# Patient Record
Sex: FEMALE | Race: WHITE | NOT HISPANIC OR LATINO | Employment: FULL TIME | URBAN - METROPOLITAN AREA
[De-identification: names, ages, dates, MRNs, and addresses within clinical notes are randomized per-mention and may not be internally consistent; named-entity substitution may affect disease eponyms.]

---

## 2018-03-22 ENCOUNTER — HOSPITAL ENCOUNTER (OUTPATIENT)
Dept: RADIOLOGY | Facility: MEDICAL CENTER | Age: 18
End: 2018-03-22
Attending: FAMILY MEDICINE
Payer: COMMERCIAL

## 2018-03-22 PROCEDURE — 74183 MRI ABD W/O CNTR FLWD CNTR: CPT

## 2018-03-22 PROCEDURE — A9581 GADOXETATE DISODIUM INJ: HCPCS | Performed by: FAMILY MEDICINE

## 2018-03-22 PROCEDURE — 700117 HCHG RX CONTRAST REV CODE 255: Performed by: FAMILY MEDICINE

## 2018-03-22 RX ADMIN — GADOXETATE DISODIUM 10 ML: 181.43 INJECTION, SOLUTION INTRAVENOUS at 11:16

## 2021-07-04 ENCOUNTER — HOSPITAL ENCOUNTER (EMERGENCY)
Facility: MEDICAL CENTER | Age: 21
End: 2021-07-05
Attending: EMERGENCY MEDICINE
Payer: COMMERCIAL

## 2021-07-04 DIAGNOSIS — T78.40XA ALLERGIC REACTION, INITIAL ENCOUNTER: ICD-10-CM

## 2021-07-04 DIAGNOSIS — J39.2 SWELLING OF THROAT: ICD-10-CM

## 2021-07-04 LAB
ALBUMIN SERPL BCP-MCNC: 4.5 G/DL (ref 3.2–4.9)
ALBUMIN/GLOB SERPL: 1.5 G/DL
ALP SERPL-CCNC: 42 U/L (ref 30–99)
ALT SERPL-CCNC: 13 U/L (ref 2–50)
ANION GAP SERPL CALC-SCNC: 13 MMOL/L (ref 7–16)
AST SERPL-CCNC: 16 U/L (ref 12–45)
BASOPHILS # BLD AUTO: 0.4 % (ref 0–1.8)
BASOPHILS # BLD: 0.03 K/UL (ref 0–0.12)
BILIRUB SERPL-MCNC: 0.3 MG/DL (ref 0.1–1.5)
BUN SERPL-MCNC: 11 MG/DL (ref 8–22)
CALCIUM SERPL-MCNC: 9.2 MG/DL (ref 8.5–10.5)
CHLORIDE SERPL-SCNC: 106 MMOL/L (ref 96–112)
CO2 SERPL-SCNC: 25 MMOL/L (ref 20–33)
CREAT SERPL-MCNC: 0.65 MG/DL (ref 0.5–1.4)
EOSINOPHIL # BLD AUTO: 0.08 K/UL (ref 0–0.51)
EOSINOPHIL NFR BLD: 1.2 % (ref 0–6.9)
ERYTHROCYTE [DISTWIDTH] IN BLOOD BY AUTOMATED COUNT: 39.8 FL (ref 35.9–50)
GLOBULIN SER CALC-MCNC: 3.1 G/DL (ref 1.9–3.5)
GLUCOSE SERPL-MCNC: 105 MG/DL (ref 65–99)
HCG SERPL QL: NEGATIVE
HCT VFR BLD AUTO: 43.1 % (ref 37–47)
HGB BLD-MCNC: 14.7 G/DL (ref 12–16)
IMM GRANULOCYTES # BLD AUTO: 0.01 K/UL (ref 0–0.11)
IMM GRANULOCYTES NFR BLD AUTO: 0.1 % (ref 0–0.9)
LYMPHOCYTES # BLD AUTO: 3.33 K/UL (ref 1–4.8)
LYMPHOCYTES NFR BLD: 49.7 % (ref 22–41)
MCH RBC QN AUTO: 31.4 PG (ref 27–33)
MCHC RBC AUTO-ENTMCNC: 34.1 G/DL (ref 33.6–35)
MCV RBC AUTO: 92.1 FL (ref 81.4–97.8)
MONOCYTES # BLD AUTO: 0.57 K/UL (ref 0–0.85)
MONOCYTES NFR BLD AUTO: 8.5 % (ref 0–13.4)
NEUTROPHILS # BLD AUTO: 2.68 K/UL (ref 2–7.15)
NEUTROPHILS NFR BLD: 40.1 % (ref 44–72)
NRBC # BLD AUTO: 0 K/UL
NRBC BLD-RTO: 0 /100 WBC
PLATELET # BLD AUTO: 206 K/UL (ref 164–446)
PMV BLD AUTO: 9.6 FL (ref 9–12.9)
POTASSIUM SERPL-SCNC: 3.4 MMOL/L (ref 3.6–5.5)
PROT SERPL-MCNC: 7.6 G/DL (ref 6–8.2)
RBC # BLD AUTO: 4.68 M/UL (ref 4.2–5.4)
SODIUM SERPL-SCNC: 144 MMOL/L (ref 135–145)
WBC # BLD AUTO: 6.7 K/UL (ref 4.8–10.8)

## 2021-07-04 PROCEDURE — 84703 CHORIONIC GONADOTROPIN ASSAY: CPT

## 2021-07-04 PROCEDURE — 700111 HCHG RX REV CODE 636 W/ 250 OVERRIDE (IP): Performed by: EMERGENCY MEDICINE

## 2021-07-04 PROCEDURE — 96375 TX/PRO/DX INJ NEW DRUG ADDON: CPT

## 2021-07-04 PROCEDURE — 96372 THER/PROPH/DIAG INJ SC/IM: CPT

## 2021-07-04 PROCEDURE — 36415 COLL VENOUS BLD VENIPUNCTURE: CPT

## 2021-07-04 PROCEDURE — 96374 THER/PROPH/DIAG INJ IV PUSH: CPT

## 2021-07-04 PROCEDURE — 85025 COMPLETE CBC W/AUTO DIFF WBC: CPT

## 2021-07-04 PROCEDURE — 99284 EMERGENCY DEPT VISIT MOD MDM: CPT

## 2021-07-04 PROCEDURE — 700111 HCHG RX REV CODE 636 W/ 250 OVERRIDE (IP)

## 2021-07-04 PROCEDURE — 80053 COMPREHEN METABOLIC PANEL: CPT

## 2021-07-04 RX ORDER — EPINEPHRINE 1 MG/ML(1)
0.3 AMPUL (ML) INJECTION ONCE
Status: COMPLETED | OUTPATIENT
Start: 2021-07-04 | End: 2021-07-04

## 2021-07-04 RX ORDER — METHYLPREDNISOLONE SODIUM SUCCINATE 125 MG/2ML
125 INJECTION, POWDER, LYOPHILIZED, FOR SOLUTION INTRAMUSCULAR; INTRAVENOUS ONCE
Status: COMPLETED | OUTPATIENT
Start: 2021-07-04 | End: 2021-07-04

## 2021-07-04 RX ORDER — DIPHENHYDRAMINE HYDROCHLORIDE 50 MG/ML
50 INJECTION INTRAMUSCULAR; INTRAVENOUS ONCE
Status: COMPLETED | OUTPATIENT
Start: 2021-07-04 | End: 2021-07-04

## 2021-07-04 RX ORDER — ONDANSETRON 2 MG/ML
4 INJECTION INTRAMUSCULAR; INTRAVENOUS ONCE
Status: DISCONTINUED | OUTPATIENT
Start: 2021-07-05 | End: 2021-07-05 | Stop reason: HOSPADM

## 2021-07-04 RX ORDER — EPINEPHRINE 1 MG/ML(1)
AMPUL (ML) INJECTION
Status: COMPLETED
Start: 2021-07-04 | End: 2021-07-04

## 2021-07-04 RX ORDER — PREDNISONE 20 MG/1
40 TABLET ORAL DAILY
Qty: 6 TABLET | Refills: 0 | Status: SHIPPED | OUTPATIENT
Start: 2021-07-04 | End: 2021-07-07

## 2021-07-04 RX ADMIN — Medication 0.3 MG: at 21:49

## 2021-07-04 RX ADMIN — EPINEPHRINE 0.3 MG: 1 INJECTION INTRAMUSCULAR; INTRAVENOUS; SUBCUTANEOUS at 21:49

## 2021-07-04 RX ADMIN — FAMOTIDINE 20 MG: 10 INJECTION INTRAVENOUS at 21:52

## 2021-07-04 RX ADMIN — METHYLPREDNISOLONE SODIUM SUCCINATE 125 MG: 125 INJECTION, POWDER, FOR SOLUTION INTRAMUSCULAR; INTRAVENOUS at 21:52

## 2021-07-04 RX ADMIN — DIPHENHYDRAMINE HYDROCHLORIDE 50 MG: 50 INJECTION INTRAMUSCULAR; INTRAVENOUS at 21:56

## 2021-07-04 ASSESSMENT — LIFESTYLE VARIABLES
EVER HAD A DRINK FIRST THING IN THE MORNING TO STEADY YOUR NERVES TO GET RID OF A HANGOVER: NO
TOTAL SCORE: 0
EVER FELT BAD OR GUILTY ABOUT YOUR DRINKING: NO
TOTAL SCORE: 0
DOES PATIENT WANT TO STOP DRINKING: NO
HAVE YOU EVER FELT YOU SHOULD CUT DOWN ON YOUR DRINKING: NO
DO YOU DRINK ALCOHOL: YES
CONSUMPTION TOTAL: POSITIVE
HAVE PEOPLE ANNOYED YOU BY CRITICIZING YOUR DRINKING: NO
AVERAGE NUMBER OF DAYS PER WEEK YOU HAVE A DRINK CONTAINING ALCOHOL: 1
ON A TYPICAL DAY WHEN YOU DRINK ALCOHOL HOW MANY DRINKS DO YOU HAVE: 2
TOTAL SCORE: 0
HOW MANY TIMES IN THE PAST YEAR HAVE YOU HAD 5 OR MORE DRINKS IN A DAY: 10

## 2021-07-05 VITALS
DIASTOLIC BLOOD PRESSURE: 66 MMHG | TEMPERATURE: 97.7 F | HEIGHT: 66 IN | BODY MASS INDEX: 22.5 KG/M2 | RESPIRATION RATE: 14 BRPM | SYSTOLIC BLOOD PRESSURE: 109 MMHG | WEIGHT: 140 LBS | HEART RATE: 78 BPM | OXYGEN SATURATION: 97 %

## 2021-07-05 PROCEDURE — 96375 TX/PRO/DX INJ NEW DRUG ADDON: CPT

## 2021-07-05 NOTE — ED TRIAGE NOTES
Pt presents to ED triage with swelling of throat, possible allergic reaction. Started 30 min PTA. Has not taken any OTC medications. + use of inhaler. Voice is horse. Tonsils and uvula are swollen. Take to red 12 and chart up for ERP

## 2021-07-05 NOTE — ED PROVIDER NOTES
ED Provider Note    Scribed for Louis Avelar M.D. by Jose Luis Santos. 7/4/2021,  9:43 PM.    Means of Arrival: Walk-in  History obtained from: Patient  History limited by: None    CHIEF COMPLAINT  Chief Complaint   Patient presents with    Allergic Reaction       HPI  Mary Dodge is a 20 y.o. female who presents to the Emergency Department for evaluation of an allergic reaction onset thirty minutes prior to arrival. She is allergic to peaches and cactus, and has had similar reactions in the past. She reports throat swelling having shortness of breath. She did not take any Benadryl prior to arrival as she did not think she could swallow it. She has been having a cough and experienced post tussive emesis prior to arrival. No rash or itching.     REVIEW OF SYSTEMS  HENT: Positive for throat swelling  RESPIRATORY:  Positive for shortness of breath, cough, post tussive emesis  GASTROINTESTINAL: Post tussive emesis and nausea  SKIN:  No rash or itching    See HPI for further details.   All other systems are negative.     PAST MEDICAL HISTORY  History reviewed. No pertinent past medical history.    FAMILY HISTORY  History reviewed. No pertinent family history.    SOCIAL HISTORY   reports that she has never smoked. She has never used smokeless tobacco. She reports current alcohol use. She reports that she does not use drugs.    SURGICAL HISTORY  History reviewed. No pertinent surgical history.    CURRENT MEDICATIONS  Home Medications       Reviewed by Maria Elena Kwan R.N. (Registered Nurse) on 07/04/21 at 2156  Med List Status: Partial     Medication Last Dose Status   Norethindrone Acet-Ethinyl Est (DAVID 1.5/30 PO)  Active   ondansetron (ZOFRAN) 4 MG TABS  Active   sertraline (ZOLOFT) 50 MG Tab  Active                    ALLERGIES  Allergies   Allergen Reactions    Food Allergy Formula      Peaches         PHYSICAL EXAM  VITAL SIGNS: /98   Pulse 73   Temp 36.5 °C (97.7 °F) (Temporal)   Resp 18   Ht  "1.676 m (5' 6\")   Wt 63.5 kg (140 lb)   SpO2 99%   BMI 22.60 kg/m²    Gen: Alert, no acute distress  HEENT: ATNC. Bilateral swelling of pharyngeal arches and uvual, displaced uvula to the right. Posterior tongue swelling.  Eyes: PERRL, EOMI, normal conjunctiva.   Neck: trachea midline  Resp: no respiratory distress, clear to auscultation bilaterally, no wheezes, rales, or crackles  CV: No JVD, regular rate and rhythm, no murmurs, gallops, or rubs  Abd: Soft, non-tender, non-distended  Ext: No deformities  Skin: No rash  Psych: normal mood  Neuro: speech fluent     DIAGNOSTIC STUDIES / PROCEDURES     LABS  Labs Reviewed   CBC WITH DIFFERENTIAL - Abnormal; Notable for the following components:       Result Value    Neutrophils-Polys 40.10 (*)     Lymphocytes 49.70 (*)     All other components within normal limits   COMP METABOLIC PANEL - Abnormal; Notable for the following components:    Potassium 3.4 (*)     Glucose 105 (*)     All other components within normal limits   HCG QUAL SERUM   ESTIMATED GFR     All labs reviewed by me.    COURSE & MEDICAL DECISION MAKING  Pertinent Labs & Imaging studies reviewed. (See chart for details)    9:43 PM Patient seen and examined at bedside. Patient will be treated with Benadryl, Pepcid, Solu-Medrol, and Epinephrine for her symptoms.    10:40 PM Patient was reevaluated at bedside. Her throat swelling has improved markedly.    11:39 PM Patient was reevaluated at bedside. She is feeling much improved.     12:20 AM Discussed discharge instructions and return precautions with the patient and they were cleared for discharge. Patient was given the opportunity to ask any further questions. She is comfortable with discharge at this time.      CRITICAL CARE  The very real possibilty of a deterioration of this patient's condition required the highest level of my preparedness for sudden, emergent intervention.  I provided critical care services, which included medication orders, " frequent reevaluations of the patient's condition and response to treatment, ordering and reviewing test results, and discussing the case with various consultants.  The critical care time associated with the care of the patient was 31 minutes. Review chart for interventions. This time is exclusive of any other billable procedures.     PPE Note: I verified that the patient was wearing a mask and I was wearing appropriate PPE every time I entered the room. The patient's mask was on the patient at all times during my encounter except for a brief view of the oropharynx.     Scribe remained outside the patient's room and did not have any contact with the patient for the duration of patient encounter.     Medical Decision Making:  Patient presents with an acute allergic reaction with throat swelling.  No lisinopril or history of angioedema to suggest ACE inhibitor induced or hereditary angioedema.  The patient symptoms markedly improved with IM epinephrine.  The patient was given steroids, histamine blockade.  She was monitored in the emergency department for several hours and continued to be well with no recurrence of symptoms.  The patient was sent home with steroids and EpiPen autoinjector.  She was given return precautions, anticipatory guidance, and the opportunist questions prior to discharge.  She was advised to follow-up with her doctor and possibly get testing for presumed egg allergy.    The patient will return for new or worsening symptoms and is stable at the time of discharge.    The patient is referred to a primary physician for blood pressure management, diabetic screening, and for all other preventative health concerns.    DISPOSITION:  Patient will be discharged home in stable condition.    FOLLOW UP:  Rhona Moreno D.O.  [address] [place] [address]  [phone]    Schedule an appointment as soon as possible for a visit       [facility], Emergency Dept  [address]  [facility] [id number] [phone]    If symptoms worsen      OUTPATIENT MEDICATIONS:  Discharge Medication List as of 7/5/2021 12:32 AM        START taking these medications    Details   EPINEPHrine 0.3 MG/0.3ML Solution Prefilled Syringe Inject 0.3 mL into the shoulder, thigh, or buttocks one time as needed (for anaphylaxis) for up to 2 doses., Disp-2 Each, R-0, Normal      predniSONE (DELTASONE) 20 MG Tab Take 2 Tablets by mouth every day for 3 days., Disp-6 tablet, R-0, Normal             FINAL IMPRESSION  1. Allergic reaction, initial encounter    2. Swelling of throat      The critical care time associated with the care of the patient was 31 minutes.      Jose Luis BURROWS (Scribe), am scribing for, and in the presence of, Louis Avelar M.D..    Electronically signed by: Jose Luis Santos (Scribe), 7/4/2021    ILouis M.D. personally performed the services described in this documentation, as scribed by Jose Luis Santos in my presence, and it is both accurate and complete.    The note accurately reflects work and decisions made by me.  Louis Avelar M.D.  7/5/2021  4:48 AM

## 2021-07-05 NOTE — DISCHARGE INSTRUCTIONS
You were seen in the emergency department for an allergic reaction. The cause of your allergies is not clear, and you should follow up with an allergist. You are being started on a several day course of steroids to keep the inflammation down. You may also take antihistamines such as Zyrtec or Benadryl and Pepcid to reduce any skin and stomach symptoms. You're being given a prescription for EpiPen. This should be carried with you and injected into your thigh if you develop inability to breathe or feel like you're going to pass out after an allergic reaction.    Please follow-up with your regular doctor as you may benefit from allergy testing.    Please return to emergency department if you develop difficulty breathing, lightheadedness, vomiting, or other concerning symptoms.

## 2021-07-05 NOTE — ED NOTES
Discharge instructions given to patient. Education provided on diagnosis, treatment, follow up, and prescriptions provided. Pt verbalized understanding. All questions answered. IV removed. Pt ambulatory to lobby.

## 2022-05-19 ENCOUNTER — HOSPITAL ENCOUNTER (OUTPATIENT)
Dept: RADIOLOGY | Facility: MEDICAL CENTER | Age: 22
End: 2022-05-19
Attending: STUDENT IN AN ORGANIZED HEALTH CARE EDUCATION/TRAINING PROGRAM
Payer: COMMERCIAL

## 2022-05-19 DIAGNOSIS — R05.3 CHRONIC COUGH: ICD-10-CM

## 2022-05-19 PROCEDURE — 71046 X-RAY EXAM CHEST 2 VIEWS: CPT

## 2022-05-24 ENCOUNTER — HOSPITAL ENCOUNTER (EMERGENCY)
Facility: MEDICAL CENTER | Age: 22
End: 2022-05-24
Attending: EMERGENCY MEDICINE
Payer: COMMERCIAL

## 2022-05-24 VITALS
SYSTOLIC BLOOD PRESSURE: 111 MMHG | HEIGHT: 66 IN | RESPIRATION RATE: 14 BRPM | WEIGHT: 175.04 LBS | BODY MASS INDEX: 28.13 KG/M2 | TEMPERATURE: 99.4 F | OXYGEN SATURATION: 99 % | DIASTOLIC BLOOD PRESSURE: 69 MMHG | HEART RATE: 64 BPM

## 2022-05-24 DIAGNOSIS — R51.9 NONINTRACTABLE HEADACHE, UNSPECIFIED CHRONICITY PATTERN, UNSPECIFIED HEADACHE TYPE: ICD-10-CM

## 2022-05-24 DIAGNOSIS — B34.9 ACUTE VIRAL SYNDROME: ICD-10-CM

## 2022-05-24 LAB
ALBUMIN SERPL BCP-MCNC: 4.4 G/DL (ref 3.2–4.9)
ALBUMIN/GLOB SERPL: 1.5 G/DL
ALP SERPL-CCNC: 50 U/L (ref 30–99)
ALT SERPL-CCNC: 23 U/L (ref 2–50)
ANION GAP SERPL CALC-SCNC: 10 MMOL/L (ref 7–16)
APPEARANCE UR: CLEAR
AST SERPL-CCNC: 22 U/L (ref 12–45)
BACTERIA #/AREA URNS HPF: NEGATIVE /HPF
BASOPHILS # BLD AUTO: 0.2 % (ref 0–1.8)
BASOPHILS # BLD: 0.01 K/UL (ref 0–0.12)
BILIRUB SERPL-MCNC: 0.4 MG/DL (ref 0.1–1.5)
BILIRUB UR QL STRIP.AUTO: NEGATIVE
BUN SERPL-MCNC: 10 MG/DL (ref 8–22)
CALCIUM SERPL-MCNC: 8.7 MG/DL (ref 8.5–10.5)
CHLORIDE SERPL-SCNC: 104 MMOL/L (ref 96–112)
CO2 SERPL-SCNC: 25 MMOL/L (ref 20–33)
COLOR UR: YELLOW
CREAT SERPL-MCNC: 0.6 MG/DL (ref 0.5–1.4)
CRP SERPL HS-MCNC: <0.3 MG/DL (ref 0–0.75)
EOSINOPHIL # BLD AUTO: 0.07 K/UL (ref 0–0.51)
EOSINOPHIL NFR BLD: 1.4 % (ref 0–6.9)
EPI CELLS #/AREA URNS HPF: NEGATIVE /HPF
ERYTHROCYTE [DISTWIDTH] IN BLOOD BY AUTOMATED COUNT: 40.4 FL (ref 35.9–50)
ERYTHROCYTE [SEDIMENTATION RATE] IN BLOOD BY WESTERGREN METHOD: 5 MM/HOUR (ref 0–25)
FLUAV RNA SPEC QL NAA+PROBE: NEGATIVE
FLUBV RNA SPEC QL NAA+PROBE: NEGATIVE
GFR SERPLBLD CREATININE-BSD FMLA CKD-EPI: 129 ML/MIN/1.73 M 2
GLOBULIN SER CALC-MCNC: 3 G/DL (ref 1.9–3.5)
GLUCOSE SERPL-MCNC: 85 MG/DL (ref 65–99)
GLUCOSE UR STRIP.AUTO-MCNC: NEGATIVE MG/DL
HCG SERPL QL: NEGATIVE
HCT VFR BLD AUTO: 44.6 % (ref 37–47)
HGB BLD-MCNC: 14.9 G/DL (ref 12–16)
HYALINE CASTS #/AREA URNS LPF: ABNORMAL /LPF
IMM GRANULOCYTES # BLD AUTO: 0.01 K/UL (ref 0–0.11)
IMM GRANULOCYTES NFR BLD AUTO: 0.2 % (ref 0–0.9)
KETONES UR STRIP.AUTO-MCNC: NEGATIVE MG/DL
LEUKOCYTE ESTERASE UR QL STRIP.AUTO: NEGATIVE
LYMPHOCYTES # BLD AUTO: 1.59 K/UL (ref 1–4.8)
LYMPHOCYTES NFR BLD: 32.6 % (ref 22–41)
MCH RBC QN AUTO: 30.7 PG (ref 27–33)
MCHC RBC AUTO-ENTMCNC: 33.4 G/DL (ref 33.6–35)
MCV RBC AUTO: 91.8 FL (ref 81.4–97.8)
MICRO URNS: ABNORMAL
MONOCYTES # BLD AUTO: 0.36 K/UL (ref 0–0.85)
MONOCYTES NFR BLD AUTO: 7.4 % (ref 0–13.4)
NEUTROPHILS # BLD AUTO: 2.84 K/UL (ref 2–7.15)
NEUTROPHILS NFR BLD: 58.2 % (ref 44–72)
NITRITE UR QL STRIP.AUTO: NEGATIVE
NRBC # BLD AUTO: 0 K/UL
NRBC BLD-RTO: 0 /100 WBC
PH UR STRIP.AUTO: 5 [PH] (ref 5–8)
PLATELET # BLD AUTO: 287 K/UL (ref 164–446)
PMV BLD AUTO: 9.5 FL (ref 9–12.9)
POTASSIUM SERPL-SCNC: 4.2 MMOL/L (ref 3.6–5.5)
PROT SERPL-MCNC: 7.4 G/DL (ref 6–8.2)
PROT UR QL STRIP: NEGATIVE MG/DL
RBC # BLD AUTO: 4.86 M/UL (ref 4.2–5.4)
RBC # URNS HPF: ABNORMAL /HPF
RBC UR QL AUTO: ABNORMAL
RSV RNA SPEC QL NAA+PROBE: NEGATIVE
SARS-COV-2 RNA RESP QL NAA+PROBE: DETECTED
SODIUM SERPL-SCNC: 139 MMOL/L (ref 135–145)
SP GR UR STRIP.AUTO: 1.02
SPECIMEN SOURCE: ABNORMAL
UROBILINOGEN UR STRIP.AUTO-MCNC: 0.2 MG/DL
WBC # BLD AUTO: 4.9 K/UL (ref 4.8–10.8)
WBC #/AREA URNS HPF: ABNORMAL /HPF

## 2022-05-24 PROCEDURE — 96375 TX/PRO/DX INJ NEW DRUG ADDON: CPT

## 2022-05-24 PROCEDURE — 85652 RBC SED RATE AUTOMATED: CPT

## 2022-05-24 PROCEDURE — 99284 EMERGENCY DEPT VISIT MOD MDM: CPT

## 2022-05-24 PROCEDURE — 80053 COMPREHEN METABOLIC PANEL: CPT

## 2022-05-24 PROCEDURE — C9803 HOPD COVID-19 SPEC COLLECT: HCPCS | Performed by: EMERGENCY MEDICINE

## 2022-05-24 PROCEDURE — 84703 CHORIONIC GONADOTROPIN ASSAY: CPT

## 2022-05-24 PROCEDURE — 96374 THER/PROPH/DIAG INJ IV PUSH: CPT

## 2022-05-24 PROCEDURE — 94760 N-INVAS EAR/PLS OXIMETRY 1: CPT

## 2022-05-24 PROCEDURE — 86140 C-REACTIVE PROTEIN: CPT

## 2022-05-24 PROCEDURE — 700105 HCHG RX REV CODE 258: Performed by: EMERGENCY MEDICINE

## 2022-05-24 PROCEDURE — 0241U HCHG SARS-COV-2 COVID-19 NFCT DS RESP RNA 4 TRGT MIC: CPT

## 2022-05-24 PROCEDURE — 700111 HCHG RX REV CODE 636 W/ 250 OVERRIDE (IP): Performed by: EMERGENCY MEDICINE

## 2022-05-24 PROCEDURE — 36415 COLL VENOUS BLD VENIPUNCTURE: CPT

## 2022-05-24 PROCEDURE — 81001 URINALYSIS AUTO W/SCOPE: CPT

## 2022-05-24 PROCEDURE — 85025 COMPLETE CBC W/AUTO DIFF WBC: CPT

## 2022-05-24 RX ORDER — SODIUM CHLORIDE, SODIUM LACTATE, POTASSIUM CHLORIDE, CALCIUM CHLORIDE 600; 310; 30; 20 MG/100ML; MG/100ML; MG/100ML; MG/100ML
1000 INJECTION, SOLUTION INTRAVENOUS ONCE
Status: COMPLETED | OUTPATIENT
Start: 2022-05-24 | End: 2022-05-24

## 2022-05-24 RX ORDER — DIPHENHYDRAMINE HYDROCHLORIDE 50 MG/ML
25 INJECTION INTRAMUSCULAR; INTRAVENOUS ONCE
Status: COMPLETED | OUTPATIENT
Start: 2022-05-24 | End: 2022-05-24

## 2022-05-24 RX ORDER — KETOROLAC TROMETHAMINE 30 MG/ML
30 INJECTION, SOLUTION INTRAMUSCULAR; INTRAVENOUS ONCE
Status: COMPLETED | OUTPATIENT
Start: 2022-05-24 | End: 2022-05-24

## 2022-05-24 RX ORDER — METOCLOPRAMIDE HYDROCHLORIDE 5 MG/ML
10 INJECTION INTRAMUSCULAR; INTRAVENOUS ONCE
Status: COMPLETED | OUTPATIENT
Start: 2022-05-24 | End: 2022-05-24

## 2022-05-24 RX ADMIN — DIPHENHYDRAMINE HYDROCHLORIDE 25 MG: 50 INJECTION INTRAMUSCULAR; INTRAVENOUS at 12:35

## 2022-05-24 RX ADMIN — METOCLOPRAMIDE 10 MG: 5 INJECTION, SOLUTION INTRAMUSCULAR; INTRAVENOUS at 12:35

## 2022-05-24 RX ADMIN — KETOROLAC TROMETHAMINE 30 MG: 30 INJECTION, SOLUTION INTRAMUSCULAR at 14:47

## 2022-05-24 RX ADMIN — SODIUM CHLORIDE, POTASSIUM CHLORIDE, SODIUM LACTATE AND CALCIUM CHLORIDE 1000 ML: 600; 310; 30; 20 INJECTION, SOLUTION INTRAVENOUS at 12:18

## 2022-05-24 ASSESSMENT — FIBROSIS 4 INDEX: FIB4 SCORE: 0.5

## 2022-05-24 NOTE — ED NOTES
Vitals taken, patient states that she feels better.  Fluids almost done, patient ready to go home.  ERP gave discharge instructions, patient denies further questions

## 2022-05-24 NOTE — ED TRIAGE NOTES
"Chief Complaint   Patient presents with   • Headache     Pt has been having headaches since she tested positive for covid on 5/8/2022. These headaches can at times cause her to feel light headed, and with a stiff neck. Pt does endorse visual changes such as light sensitivity when the headaches occur. Pain 6-7/10 on the pain scale.    • Sore Throat       Pt ambulatory to triage for the above complaint. Pt was recently treated with a course of abx for a bacterial respiratory infection. Symptoms such as headache and sore throat with a cough has since persisted, her PCP recommended she arrive to the ER for further evaluation.    Pt educated on triage process and encouraged to alert staff of any changes.    /85   Pulse 86   Temp 36.7 °C (98 °F) (Temporal)   Resp 18   Ht 1.676 m (5' 6\")   Wt 79.4 kg (175 lb 0.7 oz)   SpO2 97%   BMI 28.25 kg/m²       "

## 2022-05-24 NOTE — ED NOTES
Patient back with tech without assistance.  Patient denies vision changes, states only photosensitivity.

## 2022-05-24 NOTE — DISCHARGE INSTRUCTIONS
You have been diagnosed with COVID-19 however your symptoms and your evaluation at this time do not require management within the hospital.  You should return to the emergency department immediately should you have worsening fevers not responsive to Tylenol/ibuprofen,  should you have worsening cough, chest pain, shortness of breath,persistent hypoxia less 89%, any other acute symptoms or concerns. The following medications are over-the-counter and may help ease symptoms and duration of illness.    Vitamin C 500 mg twice daily  Zinc 75 to 100 mg/day  Melatonin 5 mg at bedtime  Vitamin D3 2000 to 4000 units/day  Aspirin  milligrams daily  Pepcid 20 mg/day  Acetaminophen 650 mg every 8 hours as needed for fever/pain  Ibuprofen 600 mg every 8 hours as needed for fever/pain

## 2022-05-24 NOTE — ED PROVIDER NOTES
"ED Provider Note        Primary care provider: Steph Kee P.A.-C.    I verified that the patient was wearing a mask and I was wearing appropriate PPE every time I entered the room. The patient's mask was on the patient at all times during my encounter except for a brief view of the oropharynx.      CHIEF COMPLAINT  Chief Complaint   Patient presents with   • Headache     Pt has been having headaches since she tested positive for covid on 5/8/2022. These headaches can at times cause her to feel light headed, and with a stiff neck. Pt does endorse visual changes such as light sensitivity when the headaches occur. Pain 6-7/10 on the pain scale.    • Sore Throat       HPI  Mary Dodge is a 21 y.o. female who presents to the Emergency Department with chief complaint of Ultiva complaints.  Patient had recent COVID infection 2 weeks prior.  She states that since that time she has been having some intermittent headaches.  Over the last couple days she is had worsening of this headache she states in the occiput of her head and somewhat into the upper neck some minor stiff neck.  She has had sensitivity to light and loud noises she does state history of previous migraines and states this feels similar but much worse.  She also states that her migraines do not usually last overnight she states that she usually goes to bed and that they are gone when she wakes up and has not been the case with us.  She was seen by her primary care physician and was placed on amoxicillin for \"concern for a bacterial infection.\"  Patient's not clear where this bacterial infection may have been she stated that she had a clear chest x-ray.  She has had some moderate sore throat.  No change in urination or bowel movements minor cough no shortness of breath minor stiff neck.  Pain 7 out of 10 with no other modifying factors.    REVIEW OF SYSTEMS  10 systems reviewed and otherwise negative, pertinent positives and negatives listed in the " "history of present illness.    PAST MEDICAL HISTORY   Migraines    SURGICAL HISTORY  patient denies any surgical history    SOCIAL HISTORY  Social History     Tobacco Use   • Smoking status: Never Smoker   • Smokeless tobacco: Never Used   Vaping Use   • Vaping Use: Never used   Substance Use Topics   • Alcohol use: Yes     Comment: occ   • Drug use: No      Social History     Substance and Sexual Activity   Drug Use No       FAMILY HISTORY  Non-Contributory    CURRENT MEDICATIONS  Home Medications    **Home medications have not yet been reviewed for this encounter**         ALLERGIES  Allergies   Allergen Reactions   • Food Allergy Formula      Peaches         PHYSICAL EXAM  VITAL SIGNS: /85   Pulse 86   Temp 36.7 °C (98 °F) (Temporal)   Resp 18   Ht 1.676 m (5' 6\")   Wt 79.4 kg (175 lb 0.7 oz)   SpO2 97%   BMI 28.25 kg/m²   Pulse ox interpretation: I interpret this pulse ox as normal.  Constitutional: Alert and oriented x 3, minimal distress  HEENT: Atraumatic normocephalic, pupils are equal round, extraocular movements are intact. The nares is clear, external ears are normal, mouth shows moist mucous membranes  Neck: no obvious JVD or tracheal deviation no pain with axial rotation or flexion extension  Cardiovascular: Regular rate and rhythm no murmur rub or gallop   Thorax & Lungs: No respiratory distress, no wheezes rales or rhonchi, No chest tenderness.   GI: Soft nontender nondistended positive bowel sounds, no peritoneal signs  Skin: Warm dry no obvious acute rash or lesion  Musculoskeletal: Moving all extremities with normal range strength, no acute  deformity  Neurologic: Cranial nerves III through XII are grossly intact, no sensory deficit, no cerebellar dysfunction   Psychiatric: Appropriate affect for situation at this time      DIAGNOSTIC STUDIES / PROCEDURES  LABS    Results for orders placed or performed during the hospital encounter of 05/24/22   CBC WITH DIFFERENTIAL   Result Value Ref " Range    WBC 4.9 4.8 - 10.8 K/uL    RBC 4.86 4.20 - 5.40 M/uL    Hemoglobin 14.9 12.0 - 16.0 g/dL    Hematocrit 44.6 37.0 - 47.0 %    MCV 91.8 81.4 - 97.8 fL    MCH 30.7 27.0 - 33.0 pg    MCHC 33.4 (L) 33.6 - 35.0 g/dL    RDW 40.4 35.9 - 50.0 fL    Platelet Count 287 164 - 446 K/uL    MPV 9.5 9.0 - 12.9 fL    Neutrophils-Polys 58.20 44.00 - 72.00 %    Lymphocytes 32.60 22.00 - 41.00 %    Monocytes 7.40 0.00 - 13.40 %    Eosinophils 1.40 0.00 - 6.90 %    Basophils 0.20 0.00 - 1.80 %    Immature Granulocytes 0.20 0.00 - 0.90 %    Nucleated RBC 0.00 /100 WBC    Neutrophils (Absolute) 2.84 2.00 - 7.15 K/uL    Lymphs (Absolute) 1.59 1.00 - 4.80 K/uL    Monos (Absolute) 0.36 0.00 - 0.85 K/uL    Eos (Absolute) 0.07 0.00 - 0.51 K/uL    Baso (Absolute) 0.01 0.00 - 0.12 K/uL    Immature Granulocytes (abs) 0.01 0.00 - 0.11 K/uL    NRBC (Absolute) 0.00 K/uL   COMP METABOLIC PANEL   Result Value Ref Range    Sodium 139 135 - 145 mmol/L    Potassium 4.2 3.6 - 5.5 mmol/L    Chloride 104 96 - 112 mmol/L    Co2 25 20 - 33 mmol/L    Anion Gap 10.0 7.0 - 16.0    Glucose 85 65 - 99 mg/dL    Bun 10 8 - 22 mg/dL    Creatinine 0.60 0.50 - 1.40 mg/dL    Calcium 8.7 8.5 - 10.5 mg/dL    AST(SGOT) 22 12 - 45 U/L    ALT(SGPT) 23 2 - 50 U/L    Alkaline Phosphatase 50 30 - 99 U/L    Total Bilirubin 0.4 0.1 - 1.5 mg/dL    Albumin 4.4 3.2 - 4.9 g/dL    Total Protein 7.4 6.0 - 8.2 g/dL    Globulin 3.0 1.9 - 3.5 g/dL    A-G Ratio 1.5 g/dL   CRP QUANTITIVE (NON-CARDIAC)   Result Value Ref Range    Stat C-Reactive Protein <0.30 0.00 - 0.75 mg/dL   BETA-HCG QUALITATIVE SERUM   Result Value Ref Range    Beta-Hcg Qualitative Serum Negative Negative   URINALYSIS    Specimen: Urine   Result Value Ref Range    Color Yellow     Character Clear     Specific Gravity 1.016 <1.035    Ph 5.0 5.0 - 8.0    Glucose Negative Negative mg/dL    Ketones Negative Negative mg/dL    Protein Negative Negative mg/dL    Bilirubin Negative Negative    Urobilinogen, Urine 0.2  Negative    Nitrite Negative Negative    Leukocyte Esterase Negative Negative    Occult Blood Trace (A) Negative    Micro Urine Req Microscopic    CoV-2, FLU A/B, and RSV by PCR (2-4 Hours CEPHEID) : Collect NP swab in VTM    Specimen: Respirate   Result Value Ref Range    Influenza virus A RNA Negative Negative    Influenza virus B, PCR Negative Negative    RSV, PCR Negative Negative    SARS-CoV-2 by PCR DETECTED (AA)     SARS-CoV-2 Source NP Swab    ESTIMATED GFR   Result Value Ref Range    GFR (CKD-EPI) 129 >60 mL/min/1.73 m 2   URINE MICROSCOPIC (W/UA)   Result Value Ref Range    WBC 0-2 /hpf    RBC 2-5 (A) /hpf    Bacteria Negative None /hpf    Epithelial Cells Negative /hpf    Hyaline Cast 0-2 /lpf       All labs reviewed by me.        COURSE & MEDICAL DECISION MAKING  Pertinent Labs & Imaging studies reviewed. (See chart for details)    11:52 AM - Patient seen and examined at bedside.          Patient was given IV fluids based on symptomatic management of her acute headache, oral hydration was not attempted due to insufficiency for hydration, after fluids had resolution of headache    Medical Decision Making: Much better after intervention.  CRP is negative white count is normal ESR is normal we discussed lumbar puncture for evaluation of meningitis due to her headache and neck stiffness.  With her very reassuring labs and her resolution of symptoms patient does not want to proceed with this procedure and I am in agreement with this as I feel as though the risks likely outweigh the benefits at this time and the chance of her having a bacterial meningitis in this case is extremely low.  Given instructions return for worsening headache neck pain altered mental status any other acute symptom changes or concerns otherwise given symptomatic instructions for this viral syndrome that she is experiencing.  She was COVID-positive at this time unclear whether this is residual from previous infection or a new infection  "however she is not hypoxic her vital signs are unremarkable stable for discharge at this time discharged in stable and improved condition.    /69   Pulse 64   Temp 37.4 °C (99.4 °F) (Temporal)   Resp 14   Ht 1.676 m (5' 6\")   Wt 79.4 kg (175 lb 0.7 oz)   SpO2 99%   BMI 28.25 kg/m²     Steph Kee P.A.-C.  [address] [id number] [phone]    In 2 days      [facility], Emergency Dept  [address] [facility] [address] [phone]        FINAL IMPRESSION  1. Acute viral syndrome Active   2. Nonintractable headache, unspecified chronicity pattern, unspecified headache type          This dictation has been created using voice recognition software and/or scribes. The accuracy of the dictation is limited by the abilities of the software and the expertise of the scribes. I expect there may be some errors of grammar and possibly content. I made every attempt to manually correct the errors within my dictation. However, errors related to voice recognition software and/or scribes may still exist and should be interpreted within the appropriate context.            "

## 2022-05-30 ENCOUNTER — HOSPITAL ENCOUNTER (INPATIENT)
Facility: MEDICAL CENTER | Age: 22
LOS: 2 days | DRG: 871 | End: 2022-06-02
Attending: EMERGENCY MEDICINE | Admitting: STUDENT IN AN ORGANIZED HEALTH CARE EDUCATION/TRAINING PROGRAM
Payer: COMMERCIAL

## 2022-05-30 DIAGNOSIS — R51.9 ACUTE INTRACTABLE HEADACHE, UNSPECIFIED HEADACHE TYPE: Primary | ICD-10-CM

## 2022-05-30 DIAGNOSIS — U07.1 COVID-19: ICD-10-CM

## 2022-05-30 DIAGNOSIS — E55.9 VITAMIN D DEFICIENCY: ICD-10-CM

## 2022-05-30 DIAGNOSIS — U09.9 COVID-19 LONG HAULER: ICD-10-CM

## 2022-05-30 PROCEDURE — 8E0ZXY6 ISOLATION: ICD-10-PCS | Performed by: EMERGENCY MEDICINE

## 2022-05-30 ASSESSMENT — FIBROSIS 4 INDEX: FIB4 SCORE: 0.37

## 2022-05-31 ENCOUNTER — APPOINTMENT (OUTPATIENT)
Dept: RADIOLOGY | Facility: MEDICAL CENTER | Age: 22
DRG: 871 | End: 2022-05-31
Attending: EMERGENCY MEDICINE
Payer: COMMERCIAL

## 2022-05-31 PROBLEM — A41.9 SEPSIS (HCC): Status: ACTIVE | Noted: 2022-05-31

## 2022-05-31 PROBLEM — U07.1 COVID-19: Status: ACTIVE | Noted: 2022-05-31

## 2022-05-31 PROBLEM — F32.A DEPRESSION: Status: ACTIVE | Noted: 2022-05-31

## 2022-05-31 LAB
ALBUMIN SERPL BCP-MCNC: 4.1 G/DL (ref 3.2–4.9)
ALBUMIN/GLOB SERPL: 1.4 G/DL
ALP SERPL-CCNC: 50 U/L (ref 30–99)
ALT SERPL-CCNC: 23 U/L (ref 2–50)
ANION GAP SERPL CALC-SCNC: 13 MMOL/L (ref 7–16)
APPEARANCE UR: ABNORMAL
APTT PPP: 31.3 SEC (ref 24.7–36)
AST SERPL-CCNC: 14 U/L (ref 12–45)
BACTERIA #/AREA URNS HPF: NEGATIVE /HPF
BASOPHILS # BLD AUTO: 0.2 % (ref 0–1.8)
BASOPHILS # BLD: 0.04 K/UL (ref 0–0.12)
BILIRUB SERPL-MCNC: 0.5 MG/DL (ref 0.1–1.5)
BILIRUB UR QL STRIP.AUTO: NEGATIVE
BUN SERPL-MCNC: 9 MG/DL (ref 8–22)
BURR CELLS/RBC NFR CSF MANUAL: 0 %
CALCIUM SERPL-MCNC: 8.8 MG/DL (ref 8.5–10.5)
CHLORIDE SERPL-SCNC: 104 MMOL/L (ref 96–112)
CLARITY CSF: CLEAR
CO2 SERPL-SCNC: 20 MMOL/L (ref 20–33)
COLOR CSF: COLORLESS
COLOR SPUN CSF: COLORLESS
COLOR UR: YELLOW
CREAT SERPL-MCNC: 0.59 MG/DL (ref 0.5–1.4)
CRP SERPL HS-MCNC: 2.74 MG/DL (ref 0–0.75)
D DIMER PPP IA.FEU-MCNC: <0.27 UG/ML (FEU) (ref 0–0.5)
EOSINOPHIL # BLD AUTO: 0 K/UL (ref 0–0.51)
EOSINOPHIL NFR BLD: 0 % (ref 0–6.9)
EPI CELLS #/AREA URNS HPF: NEGATIVE /HPF
ERYTHROCYTE [DISTWIDTH] IN BLOOD BY AUTOMATED COUNT: 38.3 FL (ref 35.9–50)
FLUAV RNA SPEC QL NAA+PROBE: NEGATIVE
FLUBV RNA SPEC QL NAA+PROBE: NEGATIVE
GFR SERPLBLD CREATININE-BSD FMLA CKD-EPI: 129 ML/MIN/1.73 M 2
GLOBULIN SER CALC-MCNC: 2.9 G/DL (ref 1.9–3.5)
GLUCOSE CSF-MCNC: 69 MG/DL (ref 40–80)
GLUCOSE SERPL-MCNC: 116 MG/DL (ref 65–99)
GLUCOSE UR STRIP.AUTO-MCNC: NEGATIVE MG/DL
GRAM STN SPEC: NORMAL
HCG SERPL QL: NEGATIVE
HCT VFR BLD AUTO: 38.6 % (ref 37–47)
HGB BLD-MCNC: 13.2 G/DL (ref 12–16)
HYALINE CASTS #/AREA URNS LPF: ABNORMAL /LPF
IMM GRANULOCYTES # BLD AUTO: 0.15 K/UL (ref 0–0.11)
IMM GRANULOCYTES NFR BLD AUTO: 0.7 % (ref 0–0.9)
INR PPP: 1 (ref 0.87–1.13)
KETONES UR STRIP.AUTO-MCNC: NEGATIVE MG/DL
LACTATE BLD-SCNC: 0.9 MMOL/L (ref 0.5–2)
LACTATE BLD-SCNC: 0.9 MMOL/L (ref 0.5–2)
LACTATE BLD-SCNC: 1.4 MMOL/L (ref 0.5–2)
LEUKOCYTE ESTERASE UR QL STRIP.AUTO: NEGATIVE
LYMPHOCYTES # BLD AUTO: 0.46 K/UL (ref 1–4.8)
LYMPHOCYTES NFR BLD: 2.3 % (ref 22–41)
LYMPHOCYTES NFR CSF: 65 %
MAGNESIUM SERPL-MCNC: 1.8 MG/DL (ref 1.5–2.5)
MCH RBC QN AUTO: 30.4 PG (ref 27–33)
MCHC RBC AUTO-ENTMCNC: 34.2 G/DL (ref 33.6–35)
MCV RBC AUTO: 88.9 FL (ref 81.4–97.8)
MICRO URNS: ABNORMAL
MONOCYTES # BLD AUTO: 1.13 K/UL (ref 0–0.85)
MONOCYTES NFR BLD AUTO: 5.6 % (ref 0–13.4)
MONONUC CELLS NFR CSF: 33 %
NEUTROPHILS # BLD AUTO: 18.3 K/UL (ref 2–7.15)
NEUTROPHILS NFR BLD: 91.2 % (ref 44–72)
NEUTROPHILS NFR CSF: 2 %
NITRITE UR QL STRIP.AUTO: NEGATIVE
NRBC # BLD AUTO: 0 K/UL
NRBC BLD-RTO: 0 /100 WBC
PH UR STRIP.AUTO: 8.5 [PH] (ref 5–8)
PHOSPHATE SERPL-MCNC: 3 MG/DL (ref 2.5–4.5)
PLATELET # BLD AUTO: 201 K/UL (ref 164–446)
PMV BLD AUTO: 9.5 FL (ref 9–12.9)
POTASSIUM SERPL-SCNC: 3.9 MMOL/L (ref 3.6–5.5)
PROCALCITONIN SERPL-MCNC: <0.05 NG/ML
PROT CSF-MCNC: 23 MG/DL (ref 15–45)
PROT SERPL-MCNC: 7 G/DL (ref 6–8.2)
PROT UR QL STRIP: NEGATIVE MG/DL
PROTHROMBIN TIME: 12.9 SEC (ref 12–14.6)
RBC # BLD AUTO: 4.34 M/UL (ref 4.2–5.4)
RBC # CSF: 1 CELLS/UL
RBC # URNS HPF: ABNORMAL /HPF
RBC UR QL AUTO: NEGATIVE
RSV RNA SPEC QL NAA+PROBE: NEGATIVE
SARS-COV-2 RNA RESP QL NAA+PROBE: DETECTED
SIGNIFICANT IND 70042: NORMAL
SITE SITE: NORMAL
SODIUM SERPL-SCNC: 137 MMOL/L (ref 135–145)
SOURCE SOURCE: NORMAL
SP GR UR STRIP.AUTO: 1.02
SPECIMEN SOURCE: ABNORMAL
SPECIMEN VOL CSF: 8.5 ML
TUBE # CSF: 3
TUBE # CSF: 4
UROBILINOGEN UR STRIP.AUTO-MCNC: 0.2 MG/DL
WBC # BLD AUTO: 20.1 K/UL (ref 4.8–10.8)
WBC # CSF: 1 CELLS/UL (ref 0–10)
WBC #/AREA URNS HPF: ABNORMAL /HPF

## 2022-05-31 PROCEDURE — 62270 DX LMBR SPI PNXR: CPT

## 2022-05-31 PROCEDURE — 85730 THROMBOPLASTIN TIME PARTIAL: CPT

## 2022-05-31 PROCEDURE — 770001 HCHG ROOM/CARE - MED/SURG/GYN PRIV*

## 2022-05-31 PROCEDURE — 96367 TX/PROPH/DG ADDL SEQ IV INF: CPT

## 2022-05-31 PROCEDURE — 700101 HCHG RX REV CODE 250: Performed by: INTERNAL MEDICINE

## 2022-05-31 PROCEDURE — 86788 WEST NILE VIRUS AB IGM: CPT

## 2022-05-31 PROCEDURE — 84100 ASSAY OF PHOSPHORUS: CPT

## 2022-05-31 PROCEDURE — 700102 HCHG RX REV CODE 250 W/ 637 OVERRIDE(OP): Performed by: STUDENT IN AN ORGANIZED HEALTH CARE EDUCATION/TRAINING PROGRAM

## 2022-05-31 PROCEDURE — 87040 BLOOD CULTURE FOR BACTERIA: CPT | Mod: 91

## 2022-05-31 PROCEDURE — 0241U HCHG SARS-COV-2 COVID-19 NFCT DS RESP RNA 4 TRGT MIC: CPT

## 2022-05-31 PROCEDURE — 96366 THER/PROPH/DIAG IV INF ADDON: CPT

## 2022-05-31 PROCEDURE — 87086 URINE CULTURE/COLONY COUNT: CPT

## 2022-05-31 PROCEDURE — 99285 EMERGENCY DEPT VISIT HI MDM: CPT

## 2022-05-31 PROCEDURE — 85610 PROTHROMBIN TIME: CPT

## 2022-05-31 PROCEDURE — 70450 CT HEAD/BRAIN W/O DYE: CPT

## 2022-05-31 PROCEDURE — 85379 FIBRIN DEGRADATION QUANT: CPT

## 2022-05-31 PROCEDURE — 87070 CULTURE OTHR SPECIMN AEROBIC: CPT

## 2022-05-31 PROCEDURE — 87205 SMEAR GRAM STAIN: CPT

## 2022-05-31 PROCEDURE — 84145 PROCALCITONIN (PCT): CPT

## 2022-05-31 PROCEDURE — 84703 CHORIONIC GONADOTROPIN ASSAY: CPT

## 2022-05-31 PROCEDURE — 700111 HCHG RX REV CODE 636 W/ 250 OVERRIDE (IP): Performed by: STUDENT IN AN ORGANIZED HEALTH CARE EDUCATION/TRAINING PROGRAM

## 2022-05-31 PROCEDURE — 99223 1ST HOSP IP/OBS HIGH 75: CPT | Mod: 25 | Performed by: STUDENT IN AN ORGANIZED HEALTH CARE EDUCATION/TRAINING PROGRAM

## 2022-05-31 PROCEDURE — A9270 NON-COVERED ITEM OR SERVICE: HCPCS | Performed by: STUDENT IN AN ORGANIZED HEALTH CARE EDUCATION/TRAINING PROGRAM

## 2022-05-31 PROCEDURE — 96375 TX/PRO/DX INJ NEW DRUG ADDON: CPT

## 2022-05-31 PROCEDURE — 85025 COMPLETE CBC W/AUTO DIFF WBC: CPT

## 2022-05-31 PROCEDURE — 71045 X-RAY EXAM CHEST 1 VIEW: CPT

## 2022-05-31 PROCEDURE — 96365 THER/PROPH/DIAG IV INF INIT: CPT

## 2022-05-31 PROCEDURE — 84157 ASSAY OF PROTEIN OTHER: CPT

## 2022-05-31 PROCEDURE — 86140 C-REACTIVE PROTEIN: CPT

## 2022-05-31 PROCEDURE — 700105 HCHG RX REV CODE 258: Performed by: STUDENT IN AN ORGANIZED HEALTH CARE EDUCATION/TRAINING PROGRAM

## 2022-05-31 PROCEDURE — 62270 DX LMBR SPI PNXR: CPT | Performed by: INTERNAL MEDICINE

## 2022-05-31 PROCEDURE — 89051 BODY FLUID CELL COUNT: CPT

## 2022-05-31 PROCEDURE — 81001 URINALYSIS AUTO W/SCOPE: CPT

## 2022-05-31 PROCEDURE — 700101 HCHG RX REV CODE 250: Performed by: EMERGENCY MEDICINE

## 2022-05-31 PROCEDURE — 700111 HCHG RX REV CODE 636 W/ 250 OVERRIDE (IP): Performed by: EMERGENCY MEDICINE

## 2022-05-31 PROCEDURE — 009U3ZX DRAINAGE OF SPINAL CANAL, PERCUTANEOUS APPROACH, DIAGNOSTIC: ICD-10-PCS | Performed by: INTERNAL MEDICINE

## 2022-05-31 PROCEDURE — C9803 HOPD COVID-19 SPEC COLLECT: HCPCS | Performed by: EMERGENCY MEDICINE

## 2022-05-31 PROCEDURE — 70544 MR ANGIOGRAPHY HEAD W/O DYE: CPT

## 2022-05-31 PROCEDURE — 86789 WEST NILE VIRUS ANTIBODY: CPT

## 2022-05-31 PROCEDURE — 80053 COMPREHEN METABOLIC PANEL: CPT

## 2022-05-31 PROCEDURE — 36415 COLL VENOUS BLD VENIPUNCTURE: CPT

## 2022-05-31 PROCEDURE — 83605 ASSAY OF LACTIC ACID: CPT | Mod: 91

## 2022-05-31 PROCEDURE — 82945 GLUCOSE OTHER FLUID: CPT

## 2022-05-31 PROCEDURE — 87483 CNS DNA AMP PROBE TYPE 12-25: CPT

## 2022-05-31 PROCEDURE — 87529 HSV DNA AMP PROBE: CPT

## 2022-05-31 PROCEDURE — 83735 ASSAY OF MAGNESIUM: CPT

## 2022-05-31 RX ORDER — KETOROLAC TROMETHAMINE 30 MG/ML
15 INJECTION, SOLUTION INTRAMUSCULAR; INTRAVENOUS ONCE
Status: COMPLETED | OUTPATIENT
Start: 2022-05-31 | End: 2022-05-31

## 2022-05-31 RX ORDER — SODIUM CHLORIDE 9 MG/ML
30 INJECTION, SOLUTION INTRAVENOUS ONCE
Status: DISCONTINUED | OUTPATIENT
Start: 2022-05-31 | End: 2022-05-31

## 2022-05-31 RX ORDER — LIDOCAINE HCL/EPINEPHRINE/PF 2%-1:200K
5 VIAL (ML) INJECTION ONCE
Status: COMPLETED | OUTPATIENT
Start: 2022-05-31 | End: 2022-05-31

## 2022-05-31 RX ORDER — HYDRALAZINE HYDROCHLORIDE 20 MG/ML
10 INJECTION INTRAMUSCULAR; INTRAVENOUS EVERY 4 HOURS PRN
Status: DISCONTINUED | OUTPATIENT
Start: 2022-05-31 | End: 2022-06-02 | Stop reason: HOSPADM

## 2022-05-31 RX ORDER — CEFTRIAXONE 2 G/1
2 INJECTION, POWDER, FOR SOLUTION INTRAMUSCULAR; INTRAVENOUS ONCE
Status: DISCONTINUED | OUTPATIENT
Start: 2022-05-31 | End: 2022-05-31

## 2022-05-31 RX ORDER — SODIUM CHLORIDE 9 MG/ML
INJECTION, SOLUTION INTRAVENOUS CONTINUOUS
Status: DISCONTINUED | OUTPATIENT
Start: 2022-05-31 | End: 2022-06-01

## 2022-05-31 RX ORDER — HYDROMORPHONE HYDROCHLORIDE 1 MG/ML
1 INJECTION, SOLUTION INTRAMUSCULAR; INTRAVENOUS; SUBCUTANEOUS EVERY 4 HOURS PRN
Status: DISCONTINUED | OUTPATIENT
Start: 2022-05-31 | End: 2022-06-02 | Stop reason: HOSPADM

## 2022-05-31 RX ORDER — AMOXICILLIN 250 MG
2 CAPSULE ORAL 2 TIMES DAILY
Status: DISCONTINUED | OUTPATIENT
Start: 2022-05-31 | End: 2022-06-02 | Stop reason: HOSPADM

## 2022-05-31 RX ORDER — IBUPROFEN 200 MG
400 TABLET ORAL EVERY 6 HOURS PRN
COMMUNITY
End: 2023-04-04

## 2022-05-31 RX ORDER — ACETAMINOPHEN 325 MG/1
650 TABLET ORAL EVERY 6 HOURS PRN
Status: DISCONTINUED | OUTPATIENT
Start: 2022-05-31 | End: 2022-06-02 | Stop reason: HOSPADM

## 2022-05-31 RX ORDER — DEXAMETHASONE 6 MG/1
6 TABLET ORAL DAILY
Status: DISCONTINUED | OUTPATIENT
Start: 2022-06-01 | End: 2022-06-02 | Stop reason: HOSPADM

## 2022-05-31 RX ORDER — ONDANSETRON 2 MG/ML
4 INJECTION INTRAMUSCULAR; INTRAVENOUS EVERY 4 HOURS PRN
Status: DISCONTINUED | OUTPATIENT
Start: 2022-05-31 | End: 2022-06-02 | Stop reason: HOSPADM

## 2022-05-31 RX ORDER — FLUOXETINE HYDROCHLORIDE 40 MG/1
40 CAPSULE ORAL DAILY
COMMUNITY

## 2022-05-31 RX ORDER — ONDANSETRON 4 MG/1
4 TABLET, ORALLY DISINTEGRATING ORAL EVERY 4 HOURS PRN
Status: DISCONTINUED | OUTPATIENT
Start: 2022-05-31 | End: 2022-06-02 | Stop reason: HOSPADM

## 2022-05-31 RX ORDER — HYDROCODONE BITARTRATE AND ACETAMINOPHEN 5; 325 MG/1; MG/1
1-2 TABLET ORAL EVERY 6 HOURS PRN
Status: DISCONTINUED | OUTPATIENT
Start: 2022-05-31 | End: 2022-06-02 | Stop reason: HOSPADM

## 2022-05-31 RX ORDER — SODIUM CHLORIDE 9 MG/ML
1000 INJECTION, SOLUTION INTRAVENOUS ONCE
Status: DISCONTINUED | OUTPATIENT
Start: 2022-05-31 | End: 2022-05-31

## 2022-05-31 RX ORDER — LORAZEPAM 2 MG/ML
1 INJECTION INTRAMUSCULAR ONCE
Status: COMPLETED | OUTPATIENT
Start: 2022-05-31 | End: 2022-05-31

## 2022-05-31 RX ORDER — FLUOXETINE HYDROCHLORIDE 20 MG/1
40 CAPSULE ORAL DAILY
Status: DISCONTINUED | OUTPATIENT
Start: 2022-05-31 | End: 2022-06-02 | Stop reason: HOSPADM

## 2022-05-31 RX ORDER — DEXAMETHASONE SODIUM PHOSPHATE 4 MG/ML
10 INJECTION, SOLUTION INTRA-ARTICULAR; INTRALESIONAL; INTRAMUSCULAR; INTRAVENOUS; SOFT TISSUE EVERY 6 HOURS
Status: DISCONTINUED | OUTPATIENT
Start: 2022-05-31 | End: 2022-05-31

## 2022-05-31 RX ORDER — POLYETHYLENE GLYCOL 3350 17 G/17G
1 POWDER, FOR SOLUTION ORAL
Status: DISCONTINUED | OUTPATIENT
Start: 2022-05-31 | End: 2022-06-02 | Stop reason: HOSPADM

## 2022-05-31 RX ORDER — PROMETHAZINE HYDROCHLORIDE 25 MG/1
12.5-25 TABLET ORAL EVERY 4 HOURS PRN
Status: DISCONTINUED | OUTPATIENT
Start: 2022-05-31 | End: 2022-06-02 | Stop reason: HOSPADM

## 2022-05-31 RX ORDER — AMOXICILLIN AND CLAVULANATE POTASSIUM 875; 125 MG/1; MG/1
1 TABLET, FILM COATED ORAL 2 TIMES DAILY
Status: ON HOLD | COMMUNITY
End: 2022-06-02

## 2022-05-31 RX ORDER — BISACODYL 10 MG
10 SUPPOSITORY, RECTAL RECTAL
Status: DISCONTINUED | OUTPATIENT
Start: 2022-05-31 | End: 2022-06-02 | Stop reason: HOSPADM

## 2022-05-31 RX ORDER — PROCHLORPERAZINE EDISYLATE 5 MG/ML
5-10 INJECTION INTRAMUSCULAR; INTRAVENOUS EVERY 4 HOURS PRN
Status: DISCONTINUED | OUTPATIENT
Start: 2022-05-31 | End: 2022-06-02 | Stop reason: HOSPADM

## 2022-05-31 RX ORDER — GUAIFENESIN 600 MG/1
600 TABLET, EXTENDED RELEASE ORAL 2 TIMES DAILY PRN
Status: DISCONTINUED | OUTPATIENT
Start: 2022-05-31 | End: 2022-06-02 | Stop reason: HOSPADM

## 2022-05-31 RX ORDER — ACETAMINOPHEN 500 MG
500-1000 TABLET ORAL EVERY 6 HOURS PRN
COMMUNITY
End: 2023-04-04

## 2022-05-31 RX ORDER — PROMETHAZINE HYDROCHLORIDE 25 MG/1
12.5-25 SUPPOSITORY RECTAL EVERY 4 HOURS PRN
Status: DISCONTINUED | OUTPATIENT
Start: 2022-05-31 | End: 2022-06-02 | Stop reason: HOSPADM

## 2022-05-31 RX ADMIN — ACYCLOVIR SODIUM 593 MG: 50 INJECTION, SOLUTION INTRAVENOUS at 07:17

## 2022-05-31 RX ADMIN — KETOROLAC TROMETHAMINE 15 MG: 30 INJECTION, SOLUTION INTRAMUSCULAR; INTRAVENOUS at 02:47

## 2022-05-31 RX ADMIN — HYDROCODONE BITARTRATE AND ACETAMINOPHEN 1 TABLET: 5; 325 TABLET ORAL at 20:42

## 2022-05-31 RX ADMIN — LIDOCAINE HYDROCHLORIDE 20 ML: 10 INJECTION, SOLUTION EPIDURAL; INFILTRATION; INTRACAUDAL; PERINEURAL at 15:35

## 2022-05-31 RX ADMIN — LORAZEPAM 1 MG: 2 INJECTION INTRAMUSCULAR; INTRAVENOUS at 03:57

## 2022-05-31 RX ADMIN — CEFTRIAXONE SODIUM 2 G: 10 INJECTION, POWDER, FOR SOLUTION INTRAVENOUS at 07:06

## 2022-05-31 RX ADMIN — SENNOSIDES AND DOCUSATE SODIUM 2 TABLET: 50; 8.6 TABLET ORAL at 17:48

## 2022-05-31 RX ADMIN — VANCOMYCIN HYDROCHLORIDE 2000 MG: 500 INJECTION, POWDER, LYOPHILIZED, FOR SOLUTION INTRAVENOUS at 08:16

## 2022-05-31 RX ADMIN — LIDOCAINE HYDROCHLORIDE AND EPINEPHRINE 5 ML: 20; 5 INJECTION, SOLUTION EPIDURAL; INFILTRATION; INTRACAUDAL; PERINEURAL at 03:00

## 2022-05-31 RX ADMIN — SODIUM CHLORIDE: 9 INJECTION, SOLUTION INTRAVENOUS at 15:27

## 2022-05-31 RX ADMIN — DEXAMETHASONE SODIUM PHOSPHATE 10 MG: 4 INJECTION, SOLUTION INTRA-ARTICULAR; INTRALESIONAL; INTRAMUSCULAR; INTRAVENOUS; SOFT TISSUE at 07:06

## 2022-05-31 RX ADMIN — SODIUM CHLORIDE: 9 INJECTION, SOLUTION INTRAVENOUS at 07:05

## 2022-05-31 ASSESSMENT — COGNITIVE AND FUNCTIONAL STATUS - GENERAL
SUGGESTED CMS G CODE MODIFIER MOBILITY: CH
DAILY ACTIVITIY SCORE: 24
SUGGESTED CMS G CODE MODIFIER DAILY ACTIVITY: CH
MOBILITY SCORE: 24

## 2022-05-31 ASSESSMENT — PATIENT HEALTH QUESTIONNAIRE - PHQ9
SUM OF ALL RESPONSES TO PHQ9 QUESTIONS 1 AND 2: 0
2. FEELING DOWN, DEPRESSED, IRRITABLE, OR HOPELESS: NOT AT ALL
1. LITTLE INTEREST OR PLEASURE IN DOING THINGS: NOT AT ALL

## 2022-05-31 ASSESSMENT — ENCOUNTER SYMPTOMS
MYALGIAS: 1
FOCAL WEAKNESS: 0
SENSORY CHANGE: 0
FALLS: 0
PALPITATIONS: 0
PHOTOPHOBIA: 1
DOUBLE VISION: 0
HEADACHES: 1
VOMITING: 1
CHILLS: 1
SPEECH CHANGE: 0
SHORTNESS OF BREATH: 0
BLURRED VISION: 0
COUGH: 0
FEVER: 1
NAUSEA: 1
NERVOUS/ANXIOUS: 0
SINUS PAIN: 0
DIARRHEA: 0
NECK PAIN: 1

## 2022-05-31 ASSESSMENT — LIFESTYLE VARIABLES
TOTAL SCORE: 0
TOTAL SCORE: 0
DOES PATIENT WANT TO STOP DRINKING: NO
CONSUMPTION TOTAL: NEGATIVE
HAVE PEOPLE ANNOYED YOU BY CRITICIZING YOUR DRINKING: NO
ON A TYPICAL DAY WHEN YOU DRINK ALCOHOL HOW MANY DRINKS DO YOU HAVE: 1
EVER HAD A DRINK FIRST THING IN THE MORNING TO STEADY YOUR NERVES TO GET RID OF A HANGOVER: NO
HOW MANY TIMES IN THE PAST YEAR HAVE YOU HAD 5 OR MORE DRINKS IN A DAY: 0
AVERAGE NUMBER OF DAYS PER WEEK YOU HAVE A DRINK CONTAINING ALCOHOL: 1
SUBSTANCE_ABUSE: 0
HAVE YOU EVER FELT YOU SHOULD CUT DOWN ON YOUR DRINKING: NO
TOTAL SCORE: 0
EVER FELT BAD OR GUILTY ABOUT YOUR DRINKING: NO
ALCOHOL_USE: YES

## 2022-05-31 ASSESSMENT — PAIN DESCRIPTION - PAIN TYPE: TYPE: ACUTE PAIN

## 2022-05-31 NOTE — ED PROVIDER NOTES
"ED Provider Note     5/31/2022  12:40 AM    Means of Arrival: Walk In  History obtained by: patient and spouse  Limitations: None  PCP: Steph Kee  CODE STATUS: Full    CHIEF COMPLAINT  Chief Complaint   Patient presents with   • Generalized Body Aches     Pt has been feeling unwell since covid+ May 8th and again on May 24th. She reports frequent migraines in addition to bodyaches, fever, n/v   • N/V     N/v and fevers started today around 1400. Pt took ibuprofen around 1400.        HPI  Mary Dodge is a 21 y.o. female who presents with concerns of continued headache, body aches and fever.  Symptoms have been ongoing since May 8.  She had a home COVID test that was positive then.  She then came to the emergency department on May 24 for continued symptoms, headache.  At that time headache similar to previous headaches.  She had no neck stiffness.  Her symptoms improved with Reglan and Benadryl.  White blood cell count slightly low, metabolic panel within acceptable limits.  Urinalysis without infection.  Her concern today is that headache is worsening.  Bilateral frontal headache.  Nothing seems to make it better or worse.  Her neck is also feeling stiff and she is having a constant soreness.  No cough.  No shortness of breath.  No chest pain.  No rash.  No vision changes.    Earlier in the month she was prescribed a course of amoxicillin for a \"bacterial infection.\"  She is unsure what her provider thought the bacterial infection involved.    REVIEW OF SYSTEMS  Review of Systems   All other systems reviewed and are negative.    See HPI for further details.    PAST MEDICAL HISTORY   Previously healthy    FAMILY HISTORY  History reviewed. No pertinent family history.    SOCIAL HISTORY  Social History     Tobacco Use   • Smoking status: Never Smoker   • Smokeless tobacco: Never Used   Vaping Use   • Vaping Use: Never used   Substance and Sexual Activity   • Alcohol use: Yes     Comment: occ   • Drug use: " "No   • Sexual activity: Not on file       SURGICAL HISTORY  patient denies any surgical history    CURRENT MEDICATIONS  Home Medications     Reviewed by Melvi Rosales (Pharmacy Tech) on 05/31/22 at 0505  Med List Status: Complete   Medication Last Dose Status   acetaminophen (TYLENOL) 500 MG Tab PRN Active   amoxicillin-clavulanate (AUGMENTIN) 875-125 MG Tab 5/30/2022 Active   EPINEPHrine 0.3 MG/0.3ML Solution Prefilled Syringe PRN Active   fluoxetine (PROZAC) 40 MG capsule 5/30/2022 Active   ibuprofen (MOTRIN) 200 MG Tab 5/30/2022 Active   Norethindrone Acet-Ethinyl Est (DAVID 1.5/30 PO) 5/29/2022 Active   Pseudoephedrine HCl (PSEUDOEPHEDRINE PO) 5/30/2022 Active   Pseudoephedrine-APAP-DM (DAYQUIL PO) 5/30/2022 Active                ALLERGIES  Allergies   Allergen Reactions   • Food Allergy Formula      Peaches         PHYSICAL EXAM  VITAL SIGNS: /68   Pulse 84   Temp 37.3 °C (99.1 °F) (Temporal)   Resp 18   Ht 1.676 m (5' 6\")   Wt 77.1 kg (170 lb)   LMP  (LMP Unknown)   SpO2 93%   BMI 27.44 kg/m²     Pulse ox interpretation: I interpret this pulse ox as normal.  Constitutional: Alert in no apparent distress.  HENT: No signs of trauma, Bilateral external ears normal, Nose normal.   Eyes: Pupils are equal, Conjunctiva normal, Non-icteric.   Neck: Pain when ranging neck specifically on flexion. no midline C-spine tenderness.  No anterior neck tenderness.  Supple, No stridor.   Cardiovascular: Regular rate and rhythm, no murmurs. Symmetric distal pulses. No cyanosis of extremities. No peripheral edema of extremities.  Thorax & Lungs: Normal breath sounds, No respiratory distress, No wheezing, No chest tenderness.   Abdomen: Soft, No tenderness, No masses, No pulsatile masses. No peritoneal signs.  Skin: Warm, Dry, No erythema, No rash.   Back: No midline bony tenderness, No CVA tenderness.   Musculoskeletal: Good range of motion in all major joints. No tenderness to palpation or major " deformities noted.   Neurologic: Alert and oriented x4, clear speech, normal eye movements,   Psychiatric: Affect normal, Judgment normal, Mood normal.   Physical Exam      DIAGNOSTIC STUDIES / PROCEDURES      LABS  Pertinent Labs & Imaging studies reviewed. (See chart for details)    RADIOLOGY  Pertinent Labs & Imaging studies reviewed. (See chart for details)    COURSE & MEDICAL DECISION MAKING  Pertinent Labs & Imaging studies reviewed. (See chart for details)    12:40 AM This is an emergent evaluation of a  21 y.o. female who presents with bodyaches, headache, neck pain. Tested positive for covid over 2 weeks ago. Still having fevers and headache worsening. Sepsis work up started.       2:33 AM  WBC now 20.  No recent steroid use. Last WBC was 4 on 5/24.  At that visit discussion was had for LP.  Because of persistent headache, WBC 20 we will proceed with LP. She is agreeable with this plan. CT head will be done first.     4:43 AM  Ct head was normal. LP attempted multiple times upright and in lateral decubitus position. Unable to access space. Difficulties with positioning and palpating spinous process. Tolerated this well but was anxious and was given ativan prior to starting procedure.    I have now placed order for radiology to perform LP under guidance.     Likely this is a headache secondary to covid 19 but we should rule out bacterial meningitis since WBC 20. I have also ordered an MRV to rule out cerebral venous thrombosis (risk factor of covid causing hypercoaguable state).  Dr. Patel, hospitalist will admit.     Lumbar Puncture Procedure Note    Indication: Suspected meningitis    Consent: The patient provided verbal consent for this procedure.    Procedure: The patient was placed in the sitting upright, left lateral decubitus position and the appropriate landmarks were identified. The area was prepped and draped in the usual sterile fashion. Anesthesia was obtained using 5 cc of {lidocaine with  epinephrine. A spinal needle was inserted at the L3- L4 level and L4-L5 with the stylet in place until spinal fluid was returned.  A sterile dressing was placed over the site and the patient was placed in the supine position.    The patient tolerated the procedure well.    Complications: unable to access space        FINAL IMPRESSION    ICD-10-CM   1. Acute intractable headache, unspecified headache type  R51.9   2. COVID-19  U07.1            This dictation was created using voice recognition software. The accuracy of the dictation is limited to the abilities of the software. I expect there may be some errors of grammar and possibly content. The nursing notes were reviewed and certain aspects of this information were incorporated into this note.    Electronically signed by: Torrey Blair II, M.D., 5/31/2022 12:40 AM

## 2022-05-31 NOTE — PROGRESS NOTES
21-year-old female with history of migraine presented 5/30 with persistent headache and fever, initially patient was diagnosed with COVID infection on 5/9, at that time patient had some coughing with dyspnea and fatigue, she received 1 course of amoxicillin, her symptoms improved and then back with severe headache with generalized weakness, sore throat and some shortness of breath, on admission patient did not have fever, she was on room air, chest x-ray did not show any infiltration and COVID test was positive, labs showed leukocytosis, LP was done to rule out meningitis, antibiotics vancomycin and ceftriaxone were started on admission.    -LP was done today, less likely to be meningitis, de-escalate antibiotic to ceftriaxone only.  -Patient feels better and she is on room air, continue supportive treatment  -Repeat labs tomorrow  -The plan of care was discussed with the patient and her  also her mother, answered all their questions.

## 2022-05-31 NOTE — ED NOTES
Med rec completed per patient's significant other at bedside  Allergies reviewed    Patient currently on a course of Augmentin [phone]

## 2022-05-31 NOTE — ED TRIAGE NOTES
"Chief Complaint   Patient presents with   • Generalized Body Aches     Pt has been feeling unwell since covid+ May 8th and again on May 24th. She reports frequent migraines in addition to bodyaches, fever, n/v   • N/V     N/v and fevers started today around 1400. Pt took ibuprofen around 1400.      Pt ambulated to triage for above complaint. Pt educated on triage process and informed to alert staff of any changes or  Concerns.     /71   Pulse (!) 105   Temp 37.3 °C (99.1 °F) (Temporal)   Resp 18   Ht 1.676 m (5' 6\")   Wt 77.1 kg (170 lb)   LMP  (LMP Unknown)   SpO2 100% Comment: Room air  BMI 27.44 kg/m²     "

## 2022-05-31 NOTE — PROGRESS NOTES
Pharmacy Vancomycin Kinetics Note for 5/31/2022     21 y.o. female on Vancomycin day # 1     Vancomycin Indication (Two level/Trough based Dosing): CNS Infection (goal trough 18-22)    Provider specified end date: 06/10/22    Active Antibiotics (From admission, onward)    Ordered     Ordering Provider       Tue May 31, 2022  5:07 AM    05/31/22 0507  vancomycin (VANCOCIN) 1,000 mg in  mL IVPB  (vancomycin (VANCOCIN) IV (LD + Maintenance))  EVERY 8 HOURS         Apollo Patel M.D.       Tue May 31, 2022  4:57 AM    05/31/22 0457  vancomycin (VANCOCIN) 2,000 mg in  mL IVPB  (vancomycin (VANCOCIN) IV (LD + Maintenance))  ONCE         Apollo Patel M.D.       Tue May 31, 2022  4:55 AM    05/31/22 0455  acyclovir (ZOVIRAX) 593 mg in  mL IVPB  (Meningitis / Encephalitis Infection)  EVERY 8 HOURS         Apollo Patel M.D.    05/31/22 0455  cefTRIAXone (Rocephin) syringe 2 g  (Meningitis / Encephalitis Infection)  EVERY 12 HOURS         Apollo Patel M.D.    05/31/22 0455  MD Alert...Vancomycin per Pharmacy  (Meningitis / Encephalitis Infection)  PHARMACY TO DOSE        Question:  Indication(s) for vancomycin?  Answer:  Central nervous system infection    Apollo Patel M.D.          Dosing Weight: 77.1 kg (169 lb 15.6 oz)      Admission History: Admitted on 5/30/2022 for Sepsis (HCC) [A41.9]  Pertinent history: Patient recently diagnosed with COVID and had neck pain at that time which subsided. Today presents with worsening neck pain, headache and white blood cell count. Plan for IR guided LP to rule out meningitis.    Allergies:     Food allergy formula     Pertinent cultures to date:     Results     Procedure Component Value Units Date/Time    Blood Culture [[id number]] Collected: 05/31/22 0145    Order Status: Sent Specimen: Blood from Peripheral Updated: 05/31/22 0548    Narrative:      Enhanced Droplet, Contact, and Eye Protection  1 of 2 for Blood Culture x 2 sites order. Per  "Hospital  Policy: Only change Specimen Src: to \"Line\" if specified by  physician order.    Urinalysis [[id number]]     Order Status: Sent Specimen: Urine     CSF CULTURE [[id number]]     Order Status: Sent Specimen: CSF from Tap     CoV-2, FLU A/B, and RSV by PCR (2-4 Hours CEPHEID) : Collect NP swab in VTM [[id number]]  (Abnormal) Collected: 05/31/22 0115    Order Status: Completed Specimen: Respirate from Nasopharyngeal Updated: 05/31/22 0415     Influenza virus A RNA Negative     Influenza virus B, PCR Negative     RSV, PCR Negative     SARS-CoV-2 by PCR DETECTED     Comment: PATIENTS: Important information regarding your results and instructions can  be found at [email or web address]   \"After your  Covid-19 Test\"    **The [facility] GeneXpert Xpress SARS-CoV-2 RT-PCR Test has been made  available for use under the Emergency Use Authorization (EUA) only.          SARS-CoV-2 Source NP Swab    Narrative:      Enhanced Droplet, Contact, and Eye Protection    Blood Culture [[id number]] Collected: 05/31/22 0015    Order Status: Sent Specimen: Blood from Peripheral Updated: 05/31/22 0155    Narrative:      Enhanced Droplet, Contact, and Eye Protection  2 of 2 blood culture x2  Sites order. Per Hospital Policy:  Only change Specimen Src: to \"Line\" if specified by physician  order.    Urinalysis [[id number]]  (Abnormal) Collected: 05/31/22 0115    Order Status: Completed Specimen: Blood from Urine, Clean Catch Updated: 05/31/22 0144     Color Yellow     Character Cloudy     Specific Gravity 1.023     Ph 8.5     Glucose Negative mg/dL      Ketones Negative mg/dL      Protein Negative mg/dL      Bilirubin Negative     Urobilinogen, Urine 0.2     Nitrite Negative     Leukocyte Esterase Negative     Occult Blood Negative     Micro Urine Req Microscopic    Narrative:      Enhanced Droplet, Contact, and Eye Protection  Indication for culture:->Evaluation for sepsis without a  clear source of infection    " "Urine Culture (NEW) [[id number]] Collected: 22    Order Status: Sent Specimen: Urine, Clean Catch Updated: 22    Narrative:      Enhanced Droplet, Contact, and Eye Protection  Indication for culture:->Evaluation for sepsis without a  clear source of infection          Labs:     Estimated Creatinine Clearance: 155.4 mL/min (by C-G formula based on SCr of 0.59 mg/dL).  Recent Labs     22   WBC 20.1*   NEUTSPOLYS 91.20*     Recent Labs     22   BUN 9   CREATININE 0.59   ALBUMIN 4.1     No intake or output data in the 24 hours ending 22   /68   Pulse 84   Temp 37.3 °C (99.1 °F) (Temporal)   Resp 18   Ht 1.676 m (5' 6\")   Wt 77.1 kg (170 lb)   SpO2 93%  Temp (24hrs), Av.3 °C (99.1 °F), Min:37.3 °C (99.1 °F), Max:37.3 °C (99.1 °F)    List concerns for Vancomycin clearance:     Nephrotoxic drugs    Pharmacokinetics:     Trough kinetics:   No results for input(s): VANCOTROUGH, VANCOPEAK, VANCORANDOM in the last 72 hours.    A/P:     -  Vancomycin dose: 1,000 mg IV Q8h (00:00 AM, 08:00 AM, 14:00 PM)     -  Next vancomycin level(s):    -6/2 Vr @ 07:30 AM    -  Comments: Trough based dosing. No current concerns for renal accumulation of vancomycin at this time. A peak and trough will be obtained at steady state. Pharmacy will continue to follow and recommend de-escalation of antibiotics as appropriate.         Terri Gee, PharmD, BCPS      "

## 2022-05-31 NOTE — H&P
Hospital Medicine History & Physical Note    Date of Service  5/31/2022    Primary Care Physician  Steph Kee P.A.-C.    Consultants  None    Code Status  Full Code    Chief Complaint  Chief Complaint   Patient presents with   • Generalized Body Aches     Pt has been feeling unwell since covid+ May 8th and again on May 24th. She reports frequent migraines in addition to bodyaches, fever, n/v   • N/V     N/v and fevers started today around 1400. Pt took ibuprofen around 1400.        History of Presenting Illness  Mary Dodge is a 21 y.o. female with history of migraines who presented 5/30/2022 with persistent headache, fever, nausea vomiting.  Patient and her  note that they initially were symptomatic beginning of May around May 4/6th, tested positive on the ninth for COVID.  She initially was symptomatic with dyspnea, cough, malaise fatigue, headaches she attributed to migraines.  She was seen in the ED on May 24, 2022 for headache and continued symptoms listed above.  She was given a course of amoxicillin by her PCP prior to the ED visit on the 24th.  Her symptoms improved after IV fluid hydration and pain control.  She did not have a white count, LP not pursued at that time.  She was COVID-positive at that time.  Since then she has had continued malaise and fatigue, headaches.  Headaches are moderate to severe at times located over her right eye near temple and left occipital region associated with photo and phonophobia and neck stiffness.  She had a fever and an episode of rigors on the day of presentation as well as nausea and vomiting so she presented for evaluation.  She denies any vision changes, changes in motor or sensation, no confusion or altered mentation, no chest pain, no longer coughing, no significant abdominal pain, no dysuria or diarrhea.    In the ED she was afebrile, tachycardic, normal respiratory rate and blood pressure, normal room air pulse oxygen saturation.  Initial  work-up showed leukocytosis 20,000, CHEM panel unremarkable with normal electrolytes renal function and liver function, UA does not indicate infection, she has positive for COVID, CRP 2.74, chest x-ray negative for acute cardiopulmonary processes, CT head negative for acute intracranial processes.  Concern for meningitis, LP attempted multiple times by ERP but unsuccessful, he has contacted radiology to perform LP and also was ordered MRV to rule out thrombus.  Patient subsequently referred to hospitalist for admission.    I discussed the plan of care with patient, family, bedside RN and pharmacy.    Review of Systems  Review of Systems   Constitutional: Positive for chills, fever and malaise/fatigue.   HENT: Negative for congestion and sinus pain.    Eyes: Positive for photophobia. Negative for blurred vision and double vision.   Respiratory: Negative for cough and shortness of breath.    Cardiovascular: Negative for chest pain and palpitations.   Gastrointestinal: Positive for nausea and vomiting. Negative for diarrhea.   Genitourinary: Negative for dysuria and urgency.   Musculoskeletal: Positive for myalgias and neck pain. Negative for falls.   Neurological: Positive for headaches. Negative for sensory change, speech change and focal weakness.   Psychiatric/Behavioral: Negative for substance abuse. The patient is not nervous/anxious.        Past Medical History   has no past medical history on file.    Surgical History   has no past surgical history on file.     Family History  family history is not on file.   Family history reviewed with patient. There is no family history that is pertinent to the chief complaint.     Social History   reports that she has never smoked. She has never used smokeless tobacco. She reports current alcohol use. She reports that she does not use drugs.    Allergies  Allergies   Allergen Reactions   • Food Allergy Formula      Peaches         Medications  Prior to Admission Medications    Prescriptions Last Dose Informant Patient Reported? Taking?   EPINEPHrine 0.3 MG/0.3ML Solution Prefilled Syringe PRN at PRN Significant Other No No   Sig: Inject 0.3 mL into the shoulder, thigh, or buttocks one time as needed (for anaphylaxis) for up to 2 doses.   Norethindrone Acet-Ethinyl Est (DAVID 1.5/30 PO) 5/29/2022 at PM Significant Other Yes No   Sig: Take  by mouth.   Pseudoephedrine HCl (PSEUDOEPHEDRINE PO) 5/30/2022 at PM Significant Other Yes Yes   Sig: Take 1 Tablet by mouth every day.   Pseudoephedrine-APAP-DM (DAYQUIL PO) 5/30/2022 at PM Significant Other Yes Yes   Sig: Take 1 Dose by mouth every 6 hours as needed (cough, congestion, body aches).   acetaminophen (TYLENOL) 500 MG Tab PRN at PRN Significant Other Yes Yes   Sig: Take 500-1,000 mg by mouth every 6 hours as needed.   amoxicillin-clavulanate (AUGMENTIN) 875-125 MG Tab 5/30/2022 at AM Significant Other Yes Yes   Sig: Take 1 Tablet by mouth 2 times a day.   fluoxetine (PROZAC) 40 MG capsule 5/30/2022 at AM Significant Other Yes Yes   Sig: Take 40 mg by mouth every day.   ibuprofen (MOTRIN) 200 MG Tab 5/30/2022 at PM Significant Other Yes Yes   Sig: Take 400 mg by mouth every 6 hours as needed.      Facility-Administered Medications: None       Physical Exam  Temp:  [37.3 °C (99.1 °F)] 37.3 °C (99.1 °F)  Pulse:  [] 84  Resp:  [18-20] 18  BP: (104-130)/(68-71) 124/68  SpO2:  [93 %-100 %] 93 %  Blood Pressure: 124/68   Temperature: 37.3 °C (99.1 °F)   Pulse: 84   Respiration: 18   Pulse Oximetry: 93 %       Physical Exam  Vitals and nursing note reviewed. Exam conducted with a chaperone present.   Constitutional:       General: She is in acute distress.      Appearance: She is ill-appearing. She is not toxic-appearing.      Comments: 21-year-old female appears stated age, drowsy following medication administration, awakens and answers questions appropriately,  at bedside, she appears ill however nontoxic, uncomfortable secondary  to headache although improved from prior   HENT:      Head: Normocephalic and atraumatic.      Nose: Nose normal. No rhinorrhea.      Mouth/Throat:      Mouth: Mucous membranes are moist.      Pharynx: Oropharynx is clear.   Eyes:      General: No scleral icterus.     Extraocular Movements: Extraocular movements intact.      Conjunctiva/sclera: Conjunctivae normal.      Pupils: Pupils are equal, round, and reactive to light.   Neck:      Comments: Neck minimally stiff, slight tenderness of paraspinal muscles and occipital region  Cardiovascular:      Rate and Rhythm: Regular rhythm. Tachycardia present.      Pulses: Normal pulses.      Heart sounds: No murmur heard.  Pulmonary:      Effort: Pulmonary effort is normal. No respiratory distress.      Breath sounds: Normal breath sounds. No wheezing, rhonchi or rales.   Abdominal:      General: Bowel sounds are normal.      Palpations: Abdomen is soft.      Tenderness: There is no abdominal tenderness. There is no guarding or rebound.   Musculoskeletal:         General: No tenderness or deformity. Normal range of motion.      Right lower leg: No edema.      Left lower leg: No edema.   Skin:     General: Skin is warm and dry.      Capillary Refill: Capillary refill takes less than 2 seconds.      Findings: No erythema or rash.   Neurological:      General: No focal deficit present.      Mental Status: She is alert and oriented to person, place, and time. Mental status is at baseline.      Cranial Nerves: No cranial nerve deficit.      Sensory: No sensory deficit.      Motor: No weakness.      Coordination: Coordination normal.      Comments: Headache with photo/phonophobia    Psychiatric:         Mood and Affect: Mood normal.         Behavior: Behavior normal.         Thought Content: Thought content normal.         Judgment: Judgment normal.         Laboratory:  Recent Labs     05/31/22  0115   WBC 20.1*   RBC 4.34   HEMOGLOBIN 13.2   HEMATOCRIT 38.6   MCV 88.9   MCH  30.4   MCHC 34.2   RDW 38.3   PLATELETCT 201   MPV 9.5     Recent Labs     05/31/22  0115   SODIUM 137   POTASSIUM 3.9   CHLORIDE 104   CO2 20   GLUCOSE 116*   BUN 9   CREATININE 0.59   CALCIUM 8.8     Recent Labs     05/31/22  0115   ALTSGPT 23   ASTSGOT 14   ALKPHOSPHAT 50   TBILIRUBIN 0.5   GLUCOSE 116*     Recent Labs     05/31/22  0115   APTT 31.3   INR 1.00     No results for input(s): NTPROBNP in the last 72 hours.      No results for input(s): TROPONINT in the last 72 hours.    Imaging:  CT-HEAD W/O   Final Result         1. No acute intracranial abnormality. No evidence of acute intracranial hemorrhage or mass lesion.                     DX-CHEST-PORTABLE (1 VIEW)   Final Result         1. No acute cardiopulmonary abnormalities are identified.      DX-LUMBAR PUNCTURE FOR DIAGNOSIS    (Results Pending)   MR-VENOGRAM (MRV) HEAD    (Results Pending)       X-Ray:  I have personally reviewed the images and compared with prior images. and My impression is: No acute cardiopulmonary processes    Assessment/Plan:  Justification for Admission Status  I anticipate this patient will require at least two midnights for appropriate medical management, necessitating inpatient admission because Patient with sepsis, COVID-positive, concern for meningitis will require LP, concern for bacteremia follow up blood cultures.    * Sepsis (HCC)- (present on admission)  Assessment & Plan  This is Sepsis Present on admission  SIRS criteria identified on my evaluation include: Tachycardia, with heart rate greater than 90 BPM and Leukocytosis, with WBC greater than 12,000  Source is possibly just viral syndrome with COVID positive, possibly meningitis, possible bacteremia.  Sepsis protocol initiated  Fluid resuscitation ordered per protocol  Crystalloid Fluid Administration: Fluid resuscitation ordered adjusted from standard protocol due to Covid positive and euvolemic   IV antibiotics as appropriate for source of sepsis  Reassessment:  I have reassessed the patient's hemodynamic status    COVID positive not on any oxygen.  Concern for meningitis started on ceftriaxone/vancomycin/acyclovir as well as Decadron, follow-up LP results.  Follow-up blood cultures.  Procalcitonin not elevated.  UA noninfectious.          Depression- (present on admission)  Assessment & Plan  Continue home fluoxetine    COVID-19- (present on admission)  Assessment & Plan  -Initially symptomatic around May fourth/sixth, initially tested positive on the ninth on home test, repeat positive on 24th and 31st.  -Not having significant respiratory complaint, presented for headache, neck stiffness, rigors and nausea vomiting.  -Receiving Decadron for empiric bacterial meningitis coverage however can be discontinued if she does not have evidence of bacterial meningitis on LP, not on any oxygen so does not need steroids for COVID infection.  -Antitussives, monitoring inflammatory markers, D-dimer, procalcitonin and LFTs  -Maintain strict isolation precautions          VTE prophylaxis: SCDs/TEDs

## 2022-05-31 NOTE — PROCEDURES
Procedure Lumbar Puncture    Date/Time: 5/31/2022 4:12 PM  Performed by: Jessica Dunne M.D.  Authorized by: Jessica Dunne M.D.     Consent:     Consent obtained:  Verbal and written    Consent given by:  Patient    Risks discussed:  Bleeding, infection, headache, nerve damage, pain and repeat procedure    Alternatives discussed:  Delayed treatment, alternative treatment and observation  Pre-procedure details:     Procedure purpose:  Diagnostic  Procedure details:     Lumbar space:  L4-L5 interspace    Needle gauge:  18    Needle length (in):  1.5    Ultrasound guidance: no      Number of attempts:  1    Fluid appearance:  Clear    Tubes of fluid:  4  Post-procedure:     Puncture site:  Adhesive bandage applied    Patient tolerance of procedure:  Tolerated well, no immediate complications

## 2022-05-31 NOTE — ED NOTES
Placed PIV; collected labs.    Rounded on Pt.    Updated Pt on plan of care. Pt verbalized understanding.  No acute distress at this time.  Will continue to monitor.

## 2022-05-31 NOTE — ASSESSMENT & PLAN NOTE
This is Sepsis Present on admission  SIRS criteria identified on my evaluation include: Tachycardia, with heart rate greater than 90 BPM and Leukocytosis, with WBC greater than 12,000  Source is possibly just viral syndrome with COVID positive, possibly meningitis, possible bacteremia.  Sepsis protocol initiated  Fluid resuscitation ordered per protocol  Crystalloid Fluid Administration: Fluid resuscitation ordered adjusted from standard protocol due to Covid positive and euvolemic   IV antibiotics as appropriate for source of sepsis  Reassessment: I have reassessed the patient's hemodynamic status    COVID positive not on any oxygen.  Concern for meningitis started on ceftriaxone/vancomycin/acyclovir as well as Decadron, follow-up LP results.  Follow-up blood cultures.  Procalcitonin not elevated.  UA noninfectious.

## 2022-05-31 NOTE — ASSESSMENT & PLAN NOTE
-Initially symptomatic around May fourth/sixth, initially tested positive on the ninth on home test, repeat positive on 24th and 31st.  -Not having significant respiratory complaint, presented for headache, neck stiffness, rigors and nausea vomiting.  -Receiving Decadron for empiric bacterial meningitis coverage however can be discontinued if she does not have evidence of bacterial meningitis on LP, not on any oxygen so does not need steroids for COVID infection.  -Antitussives, monitoring inflammatory markers, D-dimer, procalcitonin and LFTs  -Maintain strict isolation precautions

## 2022-05-31 NOTE — ED NOTES
Pt presents to the ED with complaints of headaches, body aches, and weakness.  Pt states that physical exertion exacerbates her symptoms.

## 2022-06-01 PROBLEM — R51.9 HEADACHE: Status: ACTIVE | Noted: 2022-06-01

## 2022-06-01 LAB
ANION GAP SERPL CALC-SCNC: 11 MMOL/L (ref 7–16)
BASOPHILS # BLD AUTO: 0.1 % (ref 0–1.8)
BASOPHILS # BLD: 0.01 K/UL (ref 0–0.12)
BUN SERPL-MCNC: 6 MG/DL (ref 8–22)
C GATTII+NEOFOR DNA CSF QL NAA+NON-PROBE: NOT DETECTED
CALCIUM SERPL-MCNC: 8.4 MG/DL (ref 8.5–10.5)
CHLORIDE SERPL-SCNC: 108 MMOL/L (ref 96–112)
CMV DNA CSF QL NAA+NON-PROBE: NOT DETECTED
CO2 SERPL-SCNC: 20 MMOL/L (ref 20–33)
CREAT SERPL-MCNC: 0.56 MG/DL (ref 0.5–1.4)
CRP SERPL HS-MCNC: 8.07 MG/DL (ref 0–0.75)
E COLI K1 DNA CSF QL NAA+NON-PROBE: NOT DETECTED
EOSINOPHIL # BLD AUTO: 0.01 K/UL (ref 0–0.51)
EOSINOPHIL NFR BLD: 0.1 % (ref 0–6.9)
ERYTHROCYTE [DISTWIDTH] IN BLOOD BY AUTOMATED COUNT: 39.6 FL (ref 35.9–50)
EV RNA CSF QL NAA+NON-PROBE: NOT DETECTED
GFR SERPLBLD CREATININE-BSD FMLA CKD-EPI: 131 ML/MIN/1.73 M 2
GLUCOSE SERPL-MCNC: 130 MG/DL (ref 65–99)
GP B STREP DNA CSF QL NAA+NON-PROBE: NOT DETECTED
HAEM INFLU DNA CSF QL NAA+NON-PROBE: NOT DETECTED
HCT VFR BLD AUTO: 37.6 % (ref 37–47)
HGB BLD-MCNC: 12.6 G/DL (ref 12–16)
HHV6 DNA CSF QL NAA+NON-PROBE: NOT DETECTED
HSV1 DNA CSF QL NAA+NON-PROBE: NOT DETECTED
HSV2 DNA CSF QL NAA+NON-PROBE: NOT DETECTED
IMM GRANULOCYTES # BLD AUTO: 0.06 K/UL (ref 0–0.11)
IMM GRANULOCYTES NFR BLD AUTO: 0.5 % (ref 0–0.9)
L MONOCYTOG DNA CSF QL NAA+NON-PROBE: NOT DETECTED
LYMPHOCYTES # BLD AUTO: 0.93 K/UL (ref 1–4.8)
LYMPHOCYTES NFR BLD: 7.6 % (ref 22–41)
MCH RBC QN AUTO: 30 PG (ref 27–33)
MCHC RBC AUTO-ENTMCNC: 33.5 G/DL (ref 33.6–35)
MCV RBC AUTO: 89.5 FL (ref 81.4–97.8)
MONOCYTES # BLD AUTO: 0.26 K/UL (ref 0–0.85)
MONOCYTES NFR BLD AUTO: 2.1 % (ref 0–13.4)
N MEN DNA CSF QL NAA+NON-PROBE: NOT DETECTED
NEUTROPHILS # BLD AUTO: 10.94 K/UL (ref 2–7.15)
NEUTROPHILS NFR BLD: 89.6 % (ref 44–72)
NRBC # BLD AUTO: 0 K/UL
NRBC BLD-RTO: 0 /100 WBC
PARECHOVIRUS A RNA CSF QL NAA+NON-PROBE: NOT DETECTED
PLATELET # BLD AUTO: 178 K/UL (ref 164–446)
PMV BLD AUTO: 9.7 FL (ref 9–12.9)
POTASSIUM SERPL-SCNC: 3.8 MMOL/L (ref 3.6–5.5)
RBC # BLD AUTO: 4.2 M/UL (ref 4.2–5.4)
S PNEUM DNA CSF QL NAA+NON-PROBE: NOT DETECTED
SODIUM SERPL-SCNC: 139 MMOL/L (ref 135–145)
VZV DNA CSF QL NAA+NON-PROBE: NOT DETECTED
WBC # BLD AUTO: 12.2 K/UL (ref 4.8–10.8)

## 2022-06-01 PROCEDURE — 700102 HCHG RX REV CODE 250 W/ 637 OVERRIDE(OP): Performed by: INTERNAL MEDICINE

## 2022-06-01 PROCEDURE — 700111 HCHG RX REV CODE 636 W/ 250 OVERRIDE (IP): Performed by: STUDENT IN AN ORGANIZED HEALTH CARE EDUCATION/TRAINING PROGRAM

## 2022-06-01 PROCEDURE — 99232 SBSQ HOSP IP/OBS MODERATE 35: CPT | Performed by: HOSPITALIST

## 2022-06-01 PROCEDURE — 700102 HCHG RX REV CODE 250 W/ 637 OVERRIDE(OP): Performed by: STUDENT IN AN ORGANIZED HEALTH CARE EDUCATION/TRAINING PROGRAM

## 2022-06-01 PROCEDURE — 85025 COMPLETE CBC W/AUTO DIFF WBC: CPT

## 2022-06-01 PROCEDURE — A9270 NON-COVERED ITEM OR SERVICE: HCPCS | Performed by: INTERNAL MEDICINE

## 2022-06-01 PROCEDURE — A9270 NON-COVERED ITEM OR SERVICE: HCPCS | Performed by: HOSPITALIST

## 2022-06-01 PROCEDURE — 700111 HCHG RX REV CODE 636 W/ 250 OVERRIDE (IP): Performed by: INTERNAL MEDICINE

## 2022-06-01 PROCEDURE — 770001 HCHG ROOM/CARE - MED/SURG/GYN PRIV*

## 2022-06-01 PROCEDURE — 700102 HCHG RX REV CODE 250 W/ 637 OVERRIDE(OP): Performed by: HOSPITALIST

## 2022-06-01 PROCEDURE — A9270 NON-COVERED ITEM OR SERVICE: HCPCS | Performed by: STUDENT IN AN ORGANIZED HEALTH CARE EDUCATION/TRAINING PROGRAM

## 2022-06-01 PROCEDURE — 87798 DETECT AGENT NOS DNA AMP: CPT

## 2022-06-01 PROCEDURE — 86140 C-REACTIVE PROTEIN: CPT

## 2022-06-01 PROCEDURE — 36415 COLL VENOUS BLD VENIPUNCTURE: CPT

## 2022-06-01 PROCEDURE — 80048 BASIC METABOLIC PNL TOTAL CA: CPT

## 2022-06-01 PROCEDURE — 87498 ENTEROVIRUS PROBE&REVRS TRNS: CPT

## 2022-06-01 PROCEDURE — 700105 HCHG RX REV CODE 258: Performed by: STUDENT IN AN ORGANIZED HEALTH CARE EDUCATION/TRAINING PROGRAM

## 2022-06-01 RX ORDER — ASCORBIC ACID 500 MG
500 TABLET ORAL DAILY
Status: DISCONTINUED | OUTPATIENT
Start: 2022-06-01 | End: 2022-06-02 | Stop reason: HOSPADM

## 2022-06-01 RX ADMIN — OXYCODONE HYDROCHLORIDE AND ACETAMINOPHEN 500 MG: 500 TABLET ORAL at 19:31

## 2022-06-01 RX ADMIN — SENNOSIDES AND DOCUSATE SODIUM 2 TABLET: 50; 8.6 TABLET ORAL at 05:17

## 2022-06-01 RX ADMIN — CEFTRIAXONE SODIUM 2 G: 10 INJECTION, POWDER, FOR SOLUTION INTRAVENOUS at 05:18

## 2022-06-01 RX ADMIN — HYDROCODONE BITARTRATE AND ACETAMINOPHEN 1 TABLET: 5; 325 TABLET ORAL at 09:04

## 2022-06-01 RX ADMIN — HYDROCODONE BITARTRATE AND ACETAMINOPHEN 1 TABLET: 5; 325 TABLET ORAL at 19:31

## 2022-06-01 RX ADMIN — SENNOSIDES AND DOCUSATE SODIUM 2 TABLET: 50; 8.6 TABLET ORAL at 16:48

## 2022-06-01 RX ADMIN — SODIUM CHLORIDE: 9 INJECTION, SOLUTION INTRAVENOUS at 08:28

## 2022-06-01 RX ADMIN — FLUOXETINE 40 MG: 20 CAPSULE ORAL at 05:17

## 2022-06-01 RX ADMIN — HYDROMORPHONE HYDROCHLORIDE 1 MG: 1 INJECTION, SOLUTION INTRAMUSCULAR; INTRAVENOUS; SUBCUTANEOUS at 11:57

## 2022-06-01 RX ADMIN — DEXAMETHASONE 6 MG: 6 TABLET ORAL at 05:18

## 2022-06-01 ASSESSMENT — ENCOUNTER SYMPTOMS
HEADACHES: 1
CONSTIPATION: 0
SORE THROAT: 0
SENSORY CHANGE: 0
BACK PAIN: 0
WHEEZING: 0
VOMITING: 0
ABDOMINAL PAIN: 0
DIZZINESS: 0
HEMOPTYSIS: 0
CHILLS: 1
EYE DISCHARGE: 0
SPEECH CHANGE: 0
NECK PAIN: 0
SPUTUM PRODUCTION: 0
PALPITATIONS: 0
WEAKNESS: 0
FOCAL WEAKNESS: 0
DEPRESSION: 0
SHORTNESS OF BREATH: 0
LOSS OF CONSCIOUSNESS: 0
EYE PAIN: 0
NAUSEA: 0
COUGH: 0
DIAPHORESIS: 0
CLAUDICATION: 0
FEVER: 1
BRUISES/BLEEDS EASILY: 0
DIARRHEA: 0
MYALGIAS: 1

## 2022-06-01 ASSESSMENT — LIFESTYLE VARIABLES: SUBSTANCE_ABUSE: 0

## 2022-06-01 NOTE — CARE PLAN
Stable on RA, independent. Spinal tap sight looks CDI. Able to voice concerns.No concerns currently.   The patient is Stable - Low risk of patient condition declining or worsening    Shift Goals  Clinical Goals: monitor lab and neuro  Patient Goals: Rest  Family Goals: N/A    Progress made toward(s) clinical / shift goals:    Problem: Knowledge Deficit - Standard  Goal: Patient and family/care givers will demonstrate understanding of plan of care, disease process/condition, diagnostic tests and medications  Outcome: Progressing     Problem: Hemodynamics  Goal: Patient's hemodynamics, fluid balance and neurologic status will be stable or improve  Outcome: Progressing     Problem: Fluid Volume  Goal: Fluid volume balance will be maintained  Outcome: Progressing     Problem: Urinary - Renal Perfusion  Goal: Ability to achieve and maintain adequate renal perfusion and functioning will improve  Outcome: Progressing     Problem: Respiratory  Goal: Patient will achieve/maintain optimum respiratory ventilation and gas exchange  Outcome: Progressing     Problem: Mechanical Ventilation  Goal: Safe management of artificial airway and ventilation  Outcome: Progressing  Goal: Successful weaning off mechanical ventilator, spontaneously maintains adequate gas exchange  Outcome: Progressing  Goal: Patient will be able to express needs and understand communication  Outcome: Progressing     Problem: Physical Regulation  Goal: Diagnostic test results will improve  Outcome: Progressing  Goal: Signs and symptoms of infection will decrease  Outcome: Progressing     Problem: Pain - Standard  Goal: Alleviation of pain or a reduction in pain to the patient’s comfort goal  Outcome: Progressing       Patient is not progressing towards the following goals:

## 2022-06-01 NOTE — CARE PLAN
The patient is Stable - Low risk of patient condition declining or worsening    Shift Goals  Clinical Goals: monitor labs, droplet/contact precautions  Patient Goals: rest    Progress made toward(s) clinical / shift goals:    Problem: Knowledge Deficit - Standard  Goal: Patient and family/care givers will demonstrate understanding of plan of care, disease process/condition, diagnostic tests and medications  Outcome: Progressing     Problem: Hemodynamics  Goal: Patient's hemodynamics, fluid balance and neurologic status will be stable or improve  Outcome: Progressing     Problem: Fluid Volume  Goal: Fluid volume balance will be maintained  Outcome: Progressing     Problem: Urinary - Renal Perfusion  Goal: Ability to achieve and maintain adequate renal perfusion and functioning will improve  Outcome: Progressing     Problem: Respiratory  Goal: Patient will achieve/maintain optimum respiratory ventilation and gas exchange  Outcome: Progressing     Problem: Mechanical Ventilation  Goal: Safe management of artificial airway and ventilation  Outcome: Progressing  Goal: Successful weaning off mechanical ventilator, spontaneously maintains adequate gas exchange  Outcome: Progressing  Goal: Patient will be able to express needs and understand communication  Outcome: Progressing     Problem: Physical Regulation  Goal: Diagnostic test results will improve  Outcome: Progressing  Goal: Signs and symptoms of infection will decrease  Outcome: Progressing     Problem: Pain - Standard  Goal: Alleviation of pain or a reduction in pain to the patient’s comfort goal  Outcome: Progressing       Patient is not progressing towards the following goals:

## 2022-06-01 NOTE — PROGRESS NOTES
4 Eyes Skin Assessment Completed by HIGINIO Hudson and Jorden RN.    Head WDL  Ears WDL  Nose WDL  Mouth WDL  Neck WDL  Breast/Chest WDL  Shoulder Blades WDL  Spine WDL  (R) Arm/Elbow/Hand Abrasion  (L) Arm/Elbow/Hand WDL  Abdomen WDL  Groin WDL  Scrotum/Coccyx/Buttocks WDL  (R) Leg WDL  (L) Leg WDL  (R) Heel/Foot/Toe WDL  (L) Heel/Foot/Toe WDL          Devices In Places Pulse Ox      Interventions In Place N/A    Possible Skin Injury No    Pictures Uploaded Into Epic N/A  Wound Consult Placed N/A  RN Wound Prevention Protocol Ordered No

## 2022-06-02 VITALS
SYSTOLIC BLOOD PRESSURE: 122 MMHG | OXYGEN SATURATION: 97 % | TEMPERATURE: 97.9 F | HEIGHT: 66 IN | DIASTOLIC BLOOD PRESSURE: 69 MMHG | HEART RATE: 65 BPM | BODY MASS INDEX: 27.32 KG/M2 | WEIGHT: 170 LBS | RESPIRATION RATE: 17 BRPM

## 2022-06-02 PROBLEM — U09.9 COVID-19 LONG HAULER: Status: ACTIVE | Noted: 2022-05-31

## 2022-06-02 PROBLEM — E55.9 VITAMIN D DEFICIENCY: Status: ACTIVE | Noted: 2022-06-02

## 2022-06-02 PROBLEM — A41.9 SEPSIS (HCC): Status: RESOLVED | Noted: 2022-05-31 | Resolved: 2022-06-02

## 2022-06-02 LAB
25(OH)D3 SERPL-MCNC: 19 NG/ML (ref 30–100)
ANION GAP SERPL CALC-SCNC: 9 MMOL/L (ref 7–16)
BACTERIA UR CULT: NORMAL
BASOPHILS # BLD AUTO: 0.1 % (ref 0–1.8)
BASOPHILS # BLD: 0.01 K/UL (ref 0–0.12)
BUN SERPL-MCNC: 5 MG/DL (ref 8–22)
CALCIUM SERPL-MCNC: 8.6 MG/DL (ref 8.5–10.5)
CHLORIDE SERPL-SCNC: 108 MMOL/L (ref 96–112)
CO2 SERPL-SCNC: 23 MMOL/L (ref 20–33)
CREAT SERPL-MCNC: 0.46 MG/DL (ref 0.5–1.4)
EOSINOPHIL # BLD AUTO: 0.01 K/UL (ref 0–0.51)
EOSINOPHIL NFR BLD: 0.1 % (ref 0–6.9)
ERYTHROCYTE [DISTWIDTH] IN BLOOD BY AUTOMATED COUNT: 40.5 FL (ref 35.9–50)
GFR SERPLBLD CREATININE-BSD FMLA CKD-EPI: 137 ML/MIN/1.73 M 2
GLUCOSE SERPL-MCNC: 101 MG/DL (ref 65–99)
HCT VFR BLD AUTO: 38.1 % (ref 37–47)
HGB BLD-MCNC: 12.7 G/DL (ref 12–16)
HSV DNA SPEC QL NAA+PROBE: NOT DETECTED
IMM GRANULOCYTES # BLD AUTO: 0.03 K/UL (ref 0–0.11)
IMM GRANULOCYTES NFR BLD AUTO: 0.3 % (ref 0–0.9)
LYMPHOCYTES # BLD AUTO: 2.35 K/UL (ref 1–4.8)
LYMPHOCYTES NFR BLD: 24 % (ref 22–41)
MCH RBC QN AUTO: 30.6 PG (ref 27–33)
MCHC RBC AUTO-ENTMCNC: 33.3 G/DL (ref 33.6–35)
MCV RBC AUTO: 91.8 FL (ref 81.4–97.8)
MONOCYTES # BLD AUTO: 0.69 K/UL (ref 0–0.85)
MONOCYTES NFR BLD AUTO: 7 % (ref 0–13.4)
NEUTROPHILS # BLD AUTO: 6.71 K/UL (ref 2–7.15)
NEUTROPHILS NFR BLD: 68.5 % (ref 44–72)
NRBC # BLD AUTO: 0 K/UL
NRBC BLD-RTO: 0 /100 WBC
PLATELET # BLD AUTO: 188 K/UL (ref 164–446)
PMV BLD AUTO: 9.9 FL (ref 9–12.9)
POTASSIUM SERPL-SCNC: 3.8 MMOL/L (ref 3.6–5.5)
RBC # BLD AUTO: 4.15 M/UL (ref 4.2–5.4)
SIGNIFICANT IND 70042: NORMAL
SITE SITE: NORMAL
SODIUM SERPL-SCNC: 140 MMOL/L (ref 135–145)
SOURCE SOURCE: NORMAL
SPECIMEN SOURCE: NORMAL
WBC # BLD AUTO: 9.8 K/UL (ref 4.8–10.8)

## 2022-06-02 PROCEDURE — 700111 HCHG RX REV CODE 636 W/ 250 OVERRIDE (IP): Performed by: INTERNAL MEDICINE

## 2022-06-02 PROCEDURE — 85025 COMPLETE CBC W/AUTO DIFF WBC: CPT

## 2022-06-02 PROCEDURE — 82306 VITAMIN D 25 HYDROXY: CPT

## 2022-06-02 PROCEDURE — A9270 NON-COVERED ITEM OR SERVICE: HCPCS | Performed by: HOSPITALIST

## 2022-06-02 PROCEDURE — 80048 BASIC METABOLIC PNL TOTAL CA: CPT

## 2022-06-02 PROCEDURE — 36415 COLL VENOUS BLD VENIPUNCTURE: CPT

## 2022-06-02 PROCEDURE — 700102 HCHG RX REV CODE 250 W/ 637 OVERRIDE(OP): Performed by: INTERNAL MEDICINE

## 2022-06-02 PROCEDURE — A9270 NON-COVERED ITEM OR SERVICE: HCPCS | Performed by: STUDENT IN AN ORGANIZED HEALTH CARE EDUCATION/TRAINING PROGRAM

## 2022-06-02 PROCEDURE — 99239 HOSP IP/OBS DSCHRG MGMT >30: CPT | Performed by: HOSPITALIST

## 2022-06-02 PROCEDURE — 700102 HCHG RX REV CODE 250 W/ 637 OVERRIDE(OP): Performed by: HOSPITALIST

## 2022-06-02 PROCEDURE — 700102 HCHG RX REV CODE 250 W/ 637 OVERRIDE(OP): Performed by: STUDENT IN AN ORGANIZED HEALTH CARE EDUCATION/TRAINING PROGRAM

## 2022-06-02 PROCEDURE — A9270 NON-COVERED ITEM OR SERVICE: HCPCS | Performed by: INTERNAL MEDICINE

## 2022-06-02 RX ORDER — BUTALBITAL, ACETAMINOPHEN, CAFFEINE AND CODEINE PHOSPHATE 50; 325; 40; 30 MG/1; MG/1; MG/1; MG/1
1 CAPSULE ORAL EVERY 4 HOURS PRN
Qty: 30 CAPSULE | Refills: 0 | Status: SHIPPED | OUTPATIENT
Start: 2022-06-02 | End: 2022-06-07

## 2022-06-02 RX ORDER — ASCORBIC ACID 500 MG
500 TABLET ORAL DAILY
Qty: 30 TABLET | Refills: 6 | Status: SHIPPED | OUTPATIENT
Start: 2022-06-03 | End: 2023-04-04

## 2022-06-02 RX ORDER — VITAMIN B COMPLEX
2000 TABLET ORAL DAILY
Status: DISCONTINUED | OUTPATIENT
Start: 2022-06-02 | End: 2022-06-02 | Stop reason: HOSPADM

## 2022-06-02 RX ADMIN — SENNOSIDES AND DOCUSATE SODIUM 2 TABLET: 50; 8.6 TABLET ORAL at 04:15

## 2022-06-02 RX ADMIN — OXYCODONE HYDROCHLORIDE AND ACETAMINOPHEN 500 MG: 500 TABLET ORAL at 04:14

## 2022-06-02 RX ADMIN — CEFTRIAXONE SODIUM 2 G: 10 INJECTION, POWDER, FOR SOLUTION INTRAVENOUS at 04:15

## 2022-06-02 RX ADMIN — FLUOXETINE 40 MG: 20 CAPSULE ORAL at 04:14

## 2022-06-02 RX ADMIN — DEXAMETHASONE 6 MG: 6 TABLET ORAL at 04:15

## 2022-06-02 RX ADMIN — Medication 2000 UNITS: at 09:20

## 2022-06-02 RX ADMIN — HYDROCODONE BITARTRATE AND ACETAMINOPHEN 1 TABLET: 5; 325 TABLET ORAL at 09:20

## 2022-06-02 ASSESSMENT — PAIN DESCRIPTION - PAIN TYPE: TYPE: ACUTE PAIN

## 2022-06-02 NOTE — CARE PLAN
The patient is Stable - Low risk of patient condition declining or worsening    Shift Goals  Clinical Goals: Neuro checks  Patient Goals: Rest  Family Goals: n/a    Progress made toward(s) clinical / shift goals:    Problem: Knowledge Deficit - Standard  Goal: Patient and family/care givers will demonstrate understanding of plan of care, disease process/condition, diagnostic tests and medications  Outcome: Progressing     Problem: Hemodynamics  Goal: Patient's hemodynamics, fluid balance and neurologic status will be stable or improve  Outcome: Progressing     Problem: Fluid Volume  Goal: Fluid volume balance will be maintained  Outcome: Progressing     Problem: Urinary - Renal Perfusion  Goal: Ability to achieve and maintain adequate renal perfusion and functioning will improve  Outcome: Progressing     Problem: Respiratory  Goal: Patient will achieve/maintain optimum respiratory ventilation and gas exchange  Outcome: Progressing     Problem: Mechanical Ventilation  Goal: Safe management of artificial airway and ventilation  Outcome: Progressing  Goal: Successful weaning off mechanical ventilator, spontaneously maintains adequate gas exchange  Outcome: Progressing  Goal: Patient will be able to express needs and understand communication  Outcome: Progressing     Problem: Physical Regulation  Goal: Diagnostic test results will improve  Outcome: Progressing  Goal: Signs and symptoms of infection will decrease  Outcome: Progressing     Problem: Pain - Standard  Goal: Alleviation of pain or a reduction in pain to the patient’s comfort goal  Outcome: Progressing       Patient is not progressing towards the following goals:

## 2022-06-02 NOTE — DISCHARGE SUMMARY
Discharge Summary    CHIEF COMPLAINT ON ADMISSION  Chief Complaint   Patient presents with   • Generalized Body Aches     Pt has been feeling unwell since covid+ May 8th and again on May 24th. She reports frequent migraines in addition to bodyaches, fever, n/v   • N/V     N/v and fevers started today around 1400. Pt took ibuprofen around 1400.        Reason for Admission  Flu-like symptoms     Admission Date  5/30/2022    CODE STATUS  Full Code    HPI & HOSPITAL COURSE  Mary Dodge is a 21 y.o. female with history of migraines who presented 5/30/2022 with persistent headache, fever, nausea vomiting.  Patient and her  note that they initially were symptomatic beginning of May around May 4/6th, tested positive on the ninth for COVID.  She initially was symptomatic with dyspnea, cough, malaise fatigue, headaches she attributed to migraines.  She was seen in the ED on May 24, 2022 for headache and continued symptoms listed above.  She was given a course of amoxicillin by her PCP prior to the ED visit on the 24th.  Her symptoms improved after IV fluid hydration and pain control.  She did not have a white count, LP not pursued at that time.  She was COVID-positive at that time.  Since then she has had continued malaise and fatigue, headaches.  Headaches are moderate to severe at times located over her right eye near temple and left occipital region associated with photo and phonophobia and neck stiffness.  She had a fever and an episode of rigors on the day of presentation as well as nausea and vomiting so she presented for evaluation.  She denies any vision changes, changes in motor or sensation, no confusion or altered mentation, no chest pain, no longer coughing, no significant abdominal pain, no dysuria or diarrhea.     In the ED she was afebrile, tachycardic, normal respiratory rate and blood pressure, normal room air pulse oxygen saturation.  Initial work-up showed leukocytosis 20,000, CHEM panel  unremarkable with normal electrolytes renal function and liver function, UA does not indicate infection, she has positive for COVID, CRP 2.74, chest x-ray negative for acute cardiopulmonary processes, CT head negative for acute intracranial processes.  Concern for meningitis, LP attempted multiple times by ERP but unsuccessful, he has contacted radiology to perform LP and also was ordered MRV to rule out thrombus.  Patient subsequently referred to hospitalist for admission.        Interval Problem Update  6/1: Patient had no meningeal signs on chin to chest testing on exam.  Patient states she never really recovered from her COVID-19 infection May 9, 2022 and has had prolonged relapsing fever chills and malaise as well as headache.  Thus far CSF fluid negative for bacterial meningitis.  Blood cultures negative x2 since admission.  White count remains 12.2.  She was started on Decadron for meningitis treatment on admission.  She is currently on room air.  I have added West Nile virus testing enterovirus varicella as well as HSV encephalitis panel to existing CSF fluid.  I have ordered a vitamin D level for a.m.  Placed on vitamin C.  She continues on Rocephin IV as well as Decadron currently.  LP site clean dry and intact.    Patient's headache, fever, sore throat subsided.  Wbc normalized.  No meningeal signs on exam. She was eating and ambulating well, no neuro deficits.  Sore throat improved, no exudate on exam, but erythema noted.   Procalcitonin negative.  CSF, BCs all no growth.  Encephalitis CSF panel negative.  West Nile virus pending.  Influenza negative, but COVID 19 positive.  Vitamin D level low at 19,  Recommend vitamin D and vitamin C daily.    It is possible that patient had 2 COVID 19 infections, 5/9 tested elsewhere reported as + per patient and documented + COVID 19 on 5/24 and 5/31.  This is likely the cause of her severe headache and fever/chills upon presentation.           Therefore, she is  discharged in good and stable condition to home with close outpatient follow-up.    The patient met 2-midnight criteria for an inpatient stay at the time of discharge.    Discharge Date  6/2/2022    FOLLOW UP ITEMS POST DISCHARGE  Follow up with PCP in 1 week.    DISCHARGE DIAGNOSES  Principal Problem (Resolved):    Sepsis (HCC) POA: Yes  Active Problems:    COVID-19 long hauler POA: Yes    Depression POA: Yes    Headache POA: Yes    Vitamin D deficiency POA: Yes      FOLLOW UP  No future appointments.  Steph Kee P.A.-C.  [address] [address]  [phone]    Follow up in 1 week(s)  As needed and per recommendations from Dr. Morel.      MEDICATIONS ON DISCHARGE     Medication List      START taking these medications      Instructions   ascorbic acid 500 MG tablet  Start taking on: Nancy 3, 2022  Commonly known as: Vitamin C   Take 1 Tablet by mouth every day.  Dose: 500 mg     butalbital-acetaminophen-caffeine-codeine -37-30 MG per capsule  Commonly known as: FIORICET W/CODEINE   Take 1 Capsule by mouth every four hours as needed for Headache for up to 5 days.  Dose: 1 Capsule     Cholecalciferol 2000 UNIT Tabs   Take 1 Tablet by mouth every day.  Dose: 2,000 Units        CONTINUE taking these medications      Instructions   acetaminophen 500 MG Tabs  Commonly known as: TYLENOL   Take 500-1,000 mg by mouth every 6 hours as needed.  Dose: 500-1,000 mg     EPINEPHrine 0.3 MG/0.3ML Sosy   Inject 0.3 mL into the shoulder, thigh, or buttocks one time as needed (for anaphylaxis) for up to 2 doses.  Dose: 0.3 mg     fluoxetine 40 MG capsule  Commonly known as: PROZAC   Take 40 mg by mouth every day.  Dose: 40 mg     DAVID 1.5/30 PO   Take  by mouth.     ibuprofen 200 MG Tabs  Commonly known as: MOTRIN   Take 400 mg by mouth every 6 hours as needed.  Dose: 400 mg        STOP taking these medications    amoxicillin-clavulanate 875-125 MG Tabs  Commonly known as: AUGMENTIN     DAYQUIL  PO     PSEUDOEPHEDRINE PO            Allergies  Allergies   Allergen Reactions   • Food Swelling     Cactus, nectarines   • Food Allergy Formula      Peaches         DIET  Orders Placed This Encounter   Procedures   • Diet Order Diet: Regular; Miscellaneous modifications: (optional): Vegetarian     Standing Status:   Standing     Number of Occurrences:   1     Order Specific Question:   Diet:     Answer:   Regular [1]     Order Specific Question:   Miscellaneous modifications: (optional)     Answer:   Vegetarian [13]   • Discontinue Diet Tray     Standing Status:   Standing     Number of Occurrences:   1       ACTIVITY  As tolerated.  Weight bearing as tolerated    CONSULTATIONS      PROCEDURES  Procedure Orders   Procedure Lumbar Puncture [[id number]] ordered by Jessica Dunne M.D.     Post-procedure Diagnoses   Acute intractable headache, unspecified headache type [R51.9]                Procedure Lumbar Puncture     Date/Time: 5/31/2022 4:12 PM  Performed by: Jessica Dunne M.D.  Authorized by: Jessica Dunne M.D.      Consent:     Consent obtained:  Verbal and written    Consent given by:  Patient    Risks discussed:  Bleeding, infection, headache, nerve damage, pain and repeat procedure    Alternatives discussed:  Delayed treatment, alternative treatment and observation  Pre-procedure details:     Procedure purpose:  Diagnostic  Procedure details:     Lumbar space:  L4-L5 interspace    Needle gauge:  18    Needle length (in):  1.5    Ultrasound guidance: no      Number of attempts:  1    Fluid appearance:  Clear    Tubes of fluid:  4  Post-procedure:     Puncture site:  Adhesive bandage applied    Patient tolerance of procedure:  Tolerated well, no immediate complications                 5/31/2022 2:36 AM     HISTORY/REASON FOR EXAM:  Meningitis/CNS infection suspected  Headache. Fever.     TECHNIQUE/EXAM DESCRIPTION AND NUMBER OF VIEWS:  CT of the head without contrast.     The study was performed on  a helical multidetector CT scanner. Contiguous 2.5 mm axial sections were obtained from the skull base through the vertex.     Up to date radiation dose reduction adjustments have been utilized to meet ALARA standards for radiation dose reduction.     COMPARISON:  9/18/2008     FINDINGS:  There is no evidence of acute intracranial hemorrhage, mass, mass-effect or shift of midline structures.     Gray-white matter differentiation is grossly preserved.     Ventricle size and brain parenchymal volume are appropriate for this patient's stated age.     The basal cisterns are patent.     There are no abnormal extra-axial fluid collections.     No depressed or widely  calvarial fracture is seen.     The visualized paranasal sinuses and temporal bone structures are aerated.     ___________________________________     IMPRESSION:        1. No acute intracranial abnormality. No evidence of acute intracranial hemorrhage or mass lesion.                            Exam Ended: 05/31/22  2:43 AM Last Resulted: 05/31/22  2:53 AM           5/31/2022 6:32 AM     HISTORY/REASON FOR EXAM:  Dural venous sinus thrombosis suspected.  Migraine headaches. Nausea, vomiting, fevers. History of recent bacterial infection treated with amoxicillin.     TECHNIQUE/EXAM DESCRIPTION:  MRV - Cerebral Magnetic Resonance Venogram.     The study was performed on a [employer] 1.5 Miriam MRI scanner.  Cerebral Magnetic Resonance Venography (MRV) was performed with 2D time-of-flight (TOF) technique with acquisitions obtained in the axial, coronal, and sagittal planes. Images are reviewed at the PACS or Open CS workstations as source images in all 3   planes as well as maximum intensity ray projection (MIP) multiplanar 3D reconstructions.     COMPARISON:  MRI brain 5/25/2010, head CT 5/31/2022     FINDINGS:  The superior sagittal sinus is patent.     The transverse-sigmoid dural venous sinuses and internal jugular veins at the level of the skull  base are patent. The left transverse -- sigmoid sinus and left internal jugular vein are dominant. Hypoplastic right transverse-sigmoid sinus and right   internal jugular vein.     The internal cerebral veins, vein of Don, straight sinus and torcular are patent with normal flow signal intensity.     T2 axial images of the whole brain show no extra-axial fluid collections. Ventricular system and basal cisterns are within normal limits. There are no areas of abnormal signal in the brain parenchyma.     The brainstem and posterior fossa structures are unremarkable.     The major vessels including the dural venous sinuses appear intact.     The paranasal sinuses in the field-of-view are clear. The mastoids are clear.     IMPRESSION:     1.  Normal variant dominant left transverse-sigmoid sinus and dominant left internal jugular vein. Hypoplastic right transverse-sigmoid sinus and hypoplastic right internal jugular vein.  2.  No evidence of acute dural venous sinus thrombosis.  3.  Unremarkable T2 axial images of the whole brain with no significant change from MRI 5/25/2010.             Exam Ended: 05/31/22  6:45 AM Last Resulted: 05/31/22  8:56 AM           Cryptococcus neoformans/gattii by PCR Not Detected    Cytomegalovirus by PCR Not Detected    Enterovirus by PCR Not Detected    Escherichia coli K1 by PCR Not Detected    HAEM influenzae by PCR Not Detected    HSV 1 by PCR Not Detected    HSV 2 by PCR Not Detected    Human Herpesvirus 6 by PCR Not Detected    Human parechovirus by PCR Not Detected    Listeria Monocytogenes by PCR Not Detected    Neisseria meningitidis by PCR Not Detected    Strep Agalactiae by PCR Not Detected    Strep pneumoniae by PCR Not Detected    Varicella Zoster Virus by PCR Not Detected    Comment: The [employer] ME Panel does not distinguish between latent and   active CMV and HHV-6 infections. Detection of these viruses may indicate   primary infection, secondary  reactivation, or the presence of latent virus.   Results should always be interpreted in conjunction with other clinical,   laboratory, and epidemiological information.    Resulting Agency [facility]         LABORATORY  Lab Results   Component Value Date    SODIUM 140 06/02/2022    POTASSIUM 3.8 06/02/2022    CHLORIDE 108 06/02/2022    CO2 23 06/02/2022    GLUCOSE 101 (H) 06/02/2022    BUN 5 (L) 06/02/2022    CREATININE 0.46 (L) 06/02/2022    CREATININE 0.7 09/18/2008        Lab Results   Component Value Date    WBC 9.8 06/02/2022    HEMOGLOBIN 12.7 06/02/2022    HEMATOCRIT 38.1 06/02/2022    PLATELETCT 188 06/02/2022        Total time of the discharge process exceeds 38 minutes.

## 2022-06-02 NOTE — PROGRESS NOTES
[facility] Medicine Daily Progress Note    Date of Service  6/1/2022    Chief Complaint  Mary Dodge is a 21 y.o. female admitted 5/30/2022 with fever, headache post COVID 19 infection 5/9/2022.    Hospital Course  Mary Dodge is a 21 y.o. female with history of migraines who presented 5/30/2022 with persistent headache, fever, nausea vomiting.  Patient and her  note that they initially were symptomatic beginning of May around May 4/6th, tested positive on the ninth for COVID.  She initially was symptomatic with dyspnea, cough, malaise fatigue, headaches she attributed to migraines.  She was seen in the ED on May 24, 2022 for headache and continued symptoms listed above.  She was given a course of amoxicillin by her PCP prior to the ED visit on the 24th.  Her symptoms improved after IV fluid hydration and pain control.  She did not have a white count, LP not pursued at that time.  She was COVID-positive at that time.  Since then she has had continued malaise and fatigue, headaches.  Headaches are moderate to severe at times located over her right eye near temple and left occipital region associated with photo and phonophobia and neck stiffness.  She had a fever and an episode of rigors on the day of presentation as well as nausea and vomiting so she presented for evaluation.  She denies any vision changes, changes in motor or sensation, no confusion or altered mentation, no chest pain, no longer coughing, no significant abdominal pain, no dysuria or diarrhea.     In the ED she was afebrile, tachycardic, normal respiratory rate and blood pressure, normal room air pulse oxygen saturation.  Initial work-up showed leukocytosis 20,000, CHEM panel unremarkable with normal electrolytes renal function and liver function, UA does not indicate infection, she has positive for COVID, CRP 2.74, chest x-ray negative for acute cardiopulmonary processes, CT head negative for acute intracranial processes.   Concern for meningitis, LP attempted multiple times by ERP but unsuccessful, he has contacted radiology to perform LP and also was ordered MRV to rule out thrombus.  Patient subsequently referred to hospitalist for admission.       Interval Problem Update  6/1: Patient had no meningeal signs on chin to chest testing on exam.  Patient states she never really recovered from her COVID-19 infection May 9, 2022 and has had prolonged relapsing fever chills and malaise as well as headache.  Thus far CSF fluid negative for bacterial meningitis.  Blood cultures negative x2 since admission.  White count remains 12.2.  She was started on Decadron for meningitis treatment on admission.  She is currently on room air.  I have added West Nile virus testing enterovirus varicella as well as HSV encephalitis panel to existing CSF fluid.  I have ordered a vitamin D level for a.m.  Placed on vitamin C.  She continues on Rocephin IV as well as Decadron currently.  LP site clean dry and intact.    I have personally seen and examined the patient at bedside. I discussed the plan of care with patient.    Consultants/Specialty      Code Status  Full Code    Disposition  Patient is not medically cleared for discharge. Pending CSF negative x 48 hours.  Viral meningitis panel pending.  Anticipate discharge to to home with close outpatient follow-up.  I have placed the appropriate orders for post-discharge needs.    Review of Systems  Review of Systems   Constitutional: Positive for chills, fever and malaise/fatigue. Negative for diaphoresis.   HENT: Negative for congestion and sore throat.    Eyes: Negative for pain and discharge.   Respiratory: Negative for cough, hemoptysis, sputum production, shortness of breath and wheezing.    Cardiovascular: Negative for chest pain, palpitations, claudication and leg swelling.   Gastrointestinal: Negative for abdominal pain, constipation, diarrhea, melena, nausea and vomiting.   Genitourinary: Negative  for dysuria, frequency and urgency.   Musculoskeletal: Positive for myalgias. Negative for back pain, joint pain and neck pain.   Skin: Negative for itching and rash.   Neurological: Positive for headaches. Negative for dizziness, sensory change, speech change, focal weakness, loss of consciousness and weakness.   Endo/Heme/Allergies: Does not bruise/bleed easily.   Psychiatric/Behavioral: Negative for depression, substance abuse and suicidal ideas.        Physical Exam  Temp:  [36.1 °C (97 °F)-36.6 °C (97.9 °F)] 36.1 °C (97 °F)  Pulse:  [63-69] 63  Resp:  [16-18] 18  BP: (108-125)/(67-77) 120/73  SpO2:  [94 %-98 %] 98 %    Physical Exam  Vitals and nursing note reviewed.   Constitutional:       General: She is not in acute distress.     Appearance: Normal appearance. She is well-developed. She is not diaphoretic.   HENT:      Head: Normocephalic and atraumatic.      Nose: Nose normal.      Mouth/Throat:      Pharynx: No oropharyngeal exudate.   Eyes:      General: No scleral icterus.        Right eye: No discharge.         Left eye: No discharge.      Conjunctiva/sclera: Conjunctivae normal.      Pupils: Pupils are equal, round, and reactive to light.   Neck:      Thyroid: No thyromegaly.      Vascular: No JVD.      Trachea: No tracheal deviation.      Comments: Negative meningeal signs, no neck stiffness with chin to chest movement.  Cardiovascular:      Rate and Rhythm: Normal rate and regular rhythm.      Heart sounds: Normal heart sounds. No murmur heard.    No friction rub. No gallop.   Pulmonary:      Effort: Pulmonary effort is normal. No respiratory distress.      Breath sounds: Normal breath sounds. No stridor. No wheezing or rales.   Chest:      Chest wall: No tenderness.   Abdominal:      General: Bowel sounds are normal. There is no distension.      Palpations: Abdomen is soft. There is no mass.      Tenderness: There is no abdominal tenderness. There is no guarding or rebound.   Musculoskeletal:          General: No tenderness. Normal range of motion.      Cervical back: Normal range of motion and neck supple.   Lymphadenopathy:      Cervical: No cervical adenopathy.   Skin:     General: Skin is warm and dry.      Findings: No erythema or rash.   Neurological:      General: No focal deficit present.      Mental Status: She is alert and oriented to person, place, and time. Mental status is at baseline.      Cranial Nerves: No cranial nerve deficit.      Motor: No abnormal muscle tone.      Coordination: Coordination normal.   Psychiatric:         Mood and Affect: Mood normal.         Behavior: Behavior normal.         Thought Content: Thought content normal.         Judgment: Judgment normal.         Fluids    Intake/Output Summary (Last 24 hours) at 6/1/2022 1712  Last data filed at 6/1/2022 1157  Gross per 24 hour   Intake 1840 ml   Output --   Net 1840 ml       Laboratory  Recent Labs     05/31/22  0115 06/01/22  0827   WBC 20.1* 12.2*   RBC 4.34 4.20   HEMOGLOBIN 13.2 12.6   HEMATOCRIT 38.6 37.6   MCV 88.9 89.5   MCH 30.4 30.0   MCHC 34.2 33.5*   RDW 38.3 39.6   PLATELETCT 201 178   MPV 9.5 9.7     Recent Labs     05/31/22  0115 06/01/22  0827   SODIUM 137 139   POTASSIUM 3.9 3.8   CHLORIDE 104 108   CO2 20 20   GLUCOSE 116* 130*   BUN 9 6*   CREATININE 0.59 0.56   CALCIUM 8.8 8.4*     Recent Labs     05/31/22  0115   APTT 31.3   INR 1.00               Imaging  MR-VENOGRAM (MRV) HEAD   Final Result      1.  Normal variant dominant left transverse-sigmoid sinus and dominant left internal jugular vein. Hypoplastic right transverse-sigmoid sinus and hypoplastic right internal jugular vein.   2.  No evidence of acute dural venous sinus thrombosis.   3.  Unremarkable T2 axial images of the whole brain with no significant change from MRI 5/25/2010.      CT-HEAD W/O   Final Result         1. No acute intracranial abnormality. No evidence of acute intracranial hemorrhage or mass lesion.                      DX-CHEST-PORTABLE (1 VIEW)   Final Result         1. No acute cardiopulmonary abnormalities are identified.           Assessment/Plan  * Sepsis (HCC)- (present on admission)  Assessment & Plan  This is Sepsis Present on admission  SIRS criteria identified on my evaluation include: Tachycardia, with heart rate greater than 90 BPM and Leukocytosis, with WBC greater than 12,000  Source is possibly just viral syndrome with COVID positive, possibly meningitis, possible bacteremia.  Sepsis protocol initiated  Fluid resuscitation ordered per protocol  Crystalloid Fluid Administration: Fluid resuscitation ordered adjusted from standard protocol due to Covid positive and euvolemic   IV antibiotics as appropriate for source of sepsis  Reassessment: I have reassessed the patient's hemodynamic status    COVID positive not on any oxygen.  Concern for meningitis started on ceftriaxone/vancomycin/acyclovir as well as Decadron, follow-up LP results.  Follow-up blood cultures.  Procalcitonin not elevated.  UA noninfectious.          Headache  Assessment & Plan  Severe headache and fever on presentation with recent history of COVID-19 + 5/9/2022.  Patient states she has had waxing waning symptoms since her positive COVID testing on 5/9/2022.  CSF fluid cultured and obtain for bacterial meningitis.  I have added viral encephalitis panel as well as West Nile virus testing.  MR venogram negative.  Headache resolved with Decadron 6 mg p.o. daily.  This was started for meningitis symptoms.  Patient's white blood cell count decreased from 20 to 12.2.    Depression- (present on admission)  Assessment & Plan  Continue home fluoxetine    COVID-19- (present on admission)  Assessment & Plan  -Initially symptomatic around May fourth/sixth, initially tested positive on the ninth on home test, repeat positive on 24th and 31st.  -Not having significant respiratory complaint, presented for headache, neck stiffness, rigors and nausea  vomiting.  -Receiving Decadron for empiric bacterial meningitis coverage however can be discontinued if she does not have evidence of bacterial meningitis on LP, not on any oxygen so does not need steroids for COVID infection.  -Antitussives, monitoring inflammatory markers, D-dimer, procalcitonin and LFTs  -Maintain strict isolation precautions           VTE prophylaxis: SCDs/TEDs    I have performed a physical exam and reviewed and updated ROS and Plan today (6/1/2022). In review of yesterday's note (5/31/2022), there are no changes except as documented above.

## 2022-06-02 NOTE — ASSESSMENT & PLAN NOTE
Severe headache and fever on presentation with recent history of COVID-19 + 5/9/2022.  Patient states she has had waxing waning symptoms since her positive COVID testing on 5/9/2022.  CSF fluid cultured and obtain for bacterial meningitis.  I have added viral encephalitis panel as well as West Nile virus testing.  MR venogram negative.  Headache resolved with Decadron 6 mg p.o. daily.  This was started for meningitis symptoms.  Patient's white blood cell count decreased from 20 to 12.2.

## 2022-06-02 NOTE — DISCHARGE INSTRUCTIONS
Discharge Instructions    Discharged to home by car with relative. Discharged via wheelchair, hospital escort: Yes.  Special equipment needed: Not Applicable    Be sure to schedule a follow-up appointment with your primary care doctor or any specialists as instructed.     Discharge Plan:        I understand that a diet low in cholesterol, fat, and sodium is recommended for good health. Unless I have been given specific instructions below for another diet, I accept this instruction as my diet prescription.   Other diet: None    Special Instructions: None    Is patient discharged on Warfarin / Coumadin?   No     Depression / Suicide Risk    As you are discharged from this [facility] facility, it is important to learn how to keep safe from harming yourself.    Recognize the warning signs:  Abrupt changes in personality, positive or negative- including increase in energy   Giving away possessions  Change in eating patterns- significant weight changes-  positive or negative  Change in sleeping patterns- unable to sleep or sleeping all the time   Unwillingness or inability to communicate  Depression  Unusual sadness, discouragement and loneliness  Talk of wanting to die  Neglect of personal appearance   Rebelliousness- reckless behavior  Withdrawal from people/activities they love  Confusion- inability to concentrate     If you or a loved one observes any of these behaviors or has concerns about self-harm, here's what you can do:  Talk about it- your feelings and reasons for harming yourself  Remove any means that you might use to hurt yourself (examples: pills, rope, extension cords, firearm)  Get professional help from the community (Mental Health, Substance Abuse, psychological counseling)  Do not be alone:Call your Safe Contact- someone whom you trust who will be there for you.  Call your local CRISIS HOTLINE 635-9397 or 401-352-2552  Call your local Children's Mobile Crisis Response Team [facility] (106)  342-3735 or www.[employer]  Call the toll free National Suicide Prevention Hotlines   National Suicide Prevention Lifeline 735-305-HGNA (5686)  [employer] Line Network 800-SUICIDE (069-6219)

## 2022-06-02 NOTE — PROGRESS NOTES
Pt discharged at 1230 via wheelchair escorted by staff to family ride. Discharge education paper work and education has been completed. All IV's pulled. Core measures sheet completed and sign by RN. All belongings checked and taken home with patient. All questions and concerns were met at time of discharge.

## 2022-06-02 NOTE — CARE PLAN
The patient is Stable - Low risk of patient condition declining or worsening    Shift Goals  Clinical Goals: Neuro checks  Patient Goals: Rest  Family Goals: n/a      Problem: Knowledge Deficit - Standard  Goal: Patient and family/care givers will demonstrate understanding of plan of care, disease process/condition, diagnostic tests and medications  Outcome: Progressing  Note: Pt educated on current POC, expected outcomes and goals, and new medications ordered. All questions and concerns have been answered at this time.       Problem: Pain - Standard  Goal: Alleviation of pain or a reduction in pain to the patient’s comfort goal  Outcome: Progressing  Note: Current pain regimen effective on getting patients pain level under control as needed, per patient. Educated on alternative pain relief therapies such as music, games, heat/cold, TV as distraction, and controlled breathing techniques.

## 2022-06-02 NOTE — CARE PLAN
The patient is Stable - Low risk of patient condition declining or worsening    Shift Goals  Clinical Goals: neuro checks, monitor labs  Patient Goals: pain control  Family Goals: n/a    Progress made toward(s) clinical / shift goals:  neuro checks completed. WDL. Medicated for pain as needed. X1 dilaudid, x1 norco given this shift.      Problem: Knowledge Deficit - Standard  Goal: Patient and family/care givers will demonstrate understanding of plan of care, disease process/condition, diagnostic tests and medications  Outcome: Progressing     Problem: Hemodynamics  Goal: Patient's hemodynamics, fluid balance and neurologic status will be stable or improve  Outcome: Progressing     Problem: Fluid Volume  Goal: Fluid volume balance will be maintained  Outcome: Progressing     Problem: Urinary - Renal Perfusion  Goal: Ability to achieve and maintain adequate renal perfusion and functioning will improve  Outcome: Progressing     Problem: Respiratory  Goal: Patient will achieve/maintain optimum respiratory ventilation and gas exchange  Outcome: Progressing     Problem: Pain - Standard  Goal: Alleviation of pain or a reduction in pain to the patient’s comfort goal  Outcome: Progressing       Patient is not progressing towards the following goals:

## 2022-06-03 LAB
BACTERIA CSF CULT: NORMAL
GRAM STN SPEC: NORMAL
SIGNIFICANT IND 70042: NORMAL
SITE SITE: NORMAL
SOURCE SOURCE: NORMAL
WNV IGG CSF IA-ACNC: 0.15 IV
WNV IGM CSF IA-ACNC: 0.01 IV

## 2022-06-04 LAB
EV RNA SPEC QL NAA+PROBE: NOT DETECTED
SPECIMEN SOURCE: NORMAL
SPECIMEN SOURCE: NORMAL
VZV DNA SPEC QL NAA+PROBE: NOT DETECTED

## 2022-06-05 LAB
BACTERIA BLD CULT: NORMAL
BACTERIA BLD CULT: NORMAL
SIGNIFICANT IND 70042: NORMAL
SIGNIFICANT IND 70042: NORMAL
SITE SITE: NORMAL
SITE SITE: NORMAL
SOURCE SOURCE: NORMAL
SOURCE SOURCE: NORMAL

## 2022-06-06 ENCOUNTER — ANESTHESIA EVENT (OUTPATIENT)
Dept: EMERGENCY MEDICINE | Facility: MEDICAL CENTER | Age: 22
End: 2022-06-06
Payer: COMMERCIAL

## 2022-06-06 ENCOUNTER — HOSPITAL ENCOUNTER (EMERGENCY)
Facility: MEDICAL CENTER | Age: 22
End: 2022-06-06
Attending: EMERGENCY MEDICINE
Payer: COMMERCIAL

## 2022-06-06 ENCOUNTER — ANESTHESIA (OUTPATIENT)
Dept: EMERGENCY MEDICINE | Facility: MEDICAL CENTER | Age: 22
End: 2022-06-06
Payer: COMMERCIAL

## 2022-06-06 VITALS
TEMPERATURE: 98.6 F | WEIGHT: 175.49 LBS | HEART RATE: 58 BPM | RESPIRATION RATE: 16 BRPM | SYSTOLIC BLOOD PRESSURE: 118 MMHG | DIASTOLIC BLOOD PRESSURE: 56 MMHG | BODY MASS INDEX: 28.2 KG/M2 | OXYGEN SATURATION: 100 % | HEIGHT: 66 IN

## 2022-06-06 DIAGNOSIS — G97.1 SPINAL PUNCTURE HEADACHE: ICD-10-CM

## 2022-06-06 PROCEDURE — 700105 HCHG RX REV CODE 258: Performed by: EMERGENCY MEDICINE

## 2022-06-06 PROCEDURE — 96374 THER/PROPH/DIAG INJ IV PUSH: CPT | Mod: XU

## 2022-06-06 PROCEDURE — 96375 TX/PRO/DX INJ NEW DRUG ADDON: CPT | Mod: XU

## 2022-06-06 PROCEDURE — 99284 EMERGENCY DEPT VISIT MOD MDM: CPT

## 2022-06-06 PROCEDURE — 700111 HCHG RX REV CODE 636 W/ 250 OVERRIDE (IP): Performed by: EMERGENCY MEDICINE

## 2022-06-06 PROCEDURE — 62273 INJECT EPIDURAL PATCH: CPT

## 2022-06-06 RX ORDER — PROCHLORPERAZINE EDISYLATE 5 MG/ML
10 INJECTION INTRAMUSCULAR; INTRAVENOUS ONCE
Status: COMPLETED | OUTPATIENT
Start: 2022-06-06 | End: 2022-06-06

## 2022-06-06 RX ORDER — SODIUM CHLORIDE 9 MG/ML
1000 INJECTION, SOLUTION INTRAVENOUS ONCE
Status: COMPLETED | OUTPATIENT
Start: 2022-06-06 | End: 2022-06-06

## 2022-06-06 RX ORDER — DIPHENHYDRAMINE HYDROCHLORIDE 50 MG/ML
12.5 INJECTION INTRAMUSCULAR; INTRAVENOUS ONCE
Status: COMPLETED | OUTPATIENT
Start: 2022-06-06 | End: 2022-06-06

## 2022-06-06 RX ADMIN — SODIUM CHLORIDE 1000 ML: 9 INJECTION, SOLUTION INTRAVENOUS at 14:22

## 2022-06-06 RX ADMIN — DIPHENHYDRAMINE HYDROCHLORIDE 12.5 MG: 50 INJECTION INTRAMUSCULAR; INTRAVENOUS at 14:22

## 2022-06-06 RX ADMIN — PROCHLORPERAZINE EDISYLATE 10 MG: 5 INJECTION INTRAMUSCULAR; INTRAVENOUS at 14:22

## 2022-06-06 ASSESSMENT — FIBROSIS 4 INDEX: FIB4 SCORE: 0.36

## 2022-06-06 ASSESSMENT — PAIN DESCRIPTION - PAIN TYPE: TYPE: ACUTE PAIN

## 2022-06-06 NOTE — ED NOTES
Consent signed. Pt placed in proper positioning. Assisted Dr Ortiz. 20mL blood drawn from IV with no resistance and handed to Dr Ortiz. No complications at this time, pt to lay flat for 30 minutes than Dr Ortiz will reassess pt for potential discharge.     Report provided to Chandana

## 2022-06-06 NOTE — ED PROVIDER NOTES
ED Provider Note    CHIEF COMPLAINT  Chief Complaint   Patient presents with   • Head Ache   • Nausea   • Sent by MD     Headaches since the beginning of May, worsening, told to come back by MD, spinal tap on Tuesday       HPI  Mary Dodge is a 21 y.o. female who presents to the Emergency Department with history of migraine headache she recently underwent extensive work-up for worsening headaches with an MRI, MRV and spinal tap.  She states that on Tuesday she had multiple attempts at a lumbar puncture but only bone was encountered and she finally had a lumbar puncture performed on last Wednesday, she states she is now having headaches when she stands up she feels nauseated they can be excruciating she feels better when she lies down this feels different from her migraine headache she has had no fever vomiting or other acute symptoms    REVIEW OF SYSTEMS  As above, all other systems negative.  PAST MEDICAL HISTORY     Migraine headaches states that he  COVID  SOCIAL HISTORY  Social History     Tobacco Use   • Smoking status: Never Smoker   • Smokeless tobacco: Never Used   Vaping Use   • Vaping Use: Never used   Substance and Sexual Activity   • Alcohol use: Yes     Comment: occ   • Drug use: No   • Sexual activity: Not on file       SURGICAL HISTORY  patient denies any surgical history    CURRENT MEDICATIONS  Reviewed.  See Encounter Summary.  Include   Home Medications    Medication Sig Taking? Last Dose Authorizing Provider   vitamin D3 2000 UNIT Tab Take 1 Tablet by mouth every day.   Yana Morel M.D.   ascorbic acid (VITAMIN C) 500 MG tablet Take 1 Tablet by mouth every day.   Yana Morel M.D.   butalbital-acetaminophen-caffeine-codeine (FIORICET W/CODEINE) -79-30 MG per capsule Take 1 Capsule by mouth every four hours as needed for Headache for up to 5 days.   Yana Morel M.D.   fluoxetine (PROZAC) 40 MG capsule Take 40 mg by mouth every day.   Physician Outpatient   ibuprofen (MOTRIN) 200  "MG Tab Take 400 mg by mouth every 6 hours as needed.   Physician Outpatient   acetaminophen (TYLENOL) 500 MG Tab Take 500-1,000 mg by mouth every 6 hours as needed.   Physician Outpatient   Norethindrone Acet-Ethinyl Est (DAVID 1.5/30 PO) Take  by mouth.   Nn Emergency Md Per Protocol, M.D.   EPINEPHrine 0.3 MG/0.3ML Solution Prefilled Syringe Inject 0.3 mL into the shoulder, thigh, or buttocks one time as needed (for anaphylaxis) for up to 2 doses.   Louis Avelar M.D.         ALLERGIES  Allergies   Allergen Reactions   • Food Swelling     Cactus, nectarines   • Food Allergy Formula      Peaches         PHYSICAL EXAM  VITAL SIGNS: /74   Pulse 66   Temp 36.4 °C (97.5 °F) (Temporal)   Resp 16   Ht 1.676 m (5' 6\")   Wt 79.6 kg (175 lb 7.8 oz)   LMP 05/24/2022   SpO2 99%   BMI 28.32 kg/m²   Constitutional:  Alert in no apparent distress.  HENT: Normocephalic, Bilateral external ears normal. Nose normal.   Eyes: Pupils are equal and reactive. Conjunctiva normal, non-icteric.   Thorax & Lungs: Easy unlabored respirations  Abdomen:  No gross signs of peritonitis, no pain with movement   Skin: Visualized skin is  Dry, No erythema, No rash.   Extremities:   No edema, No asymmetry  Neurologic: Alert, Grossly non-focal.   Psychiatric: Affect and Mood normal      COURSE & MEDICAL DECISION MAKING  Nursing notes and vital signs were reviewed. (See chart for details)    The patient presents to the Emergency Department with what historically appears to be a spinal headache.  She will receive Compazine and Benadryl IV hydration here and we will attempt to find an anesthesiologist to perform a spinal tap    Anesthesiology came in and felt that the patient was exhibiting a spinal tap headache, they have performed a spinal tap she will be discharged home with outpatient follow-up she knows to return if she has any fevers worsening headache or new complaints          The patient was discharged home with an information " sheet on spinal headacheDictation #1  MRN:[id number]  CSN:[id number]   and told to return immediately for any signs or symptoms listed, or any unexpected symptoms.  The patient verbally agreed to the discharge precautions and follow-up plan which is documented in EPIC.  DISPOSITION:    Patient will be discharged home in stable condition.    FOLLOW UP:  Steph Kee P.A.-C.  [address] [id number] [phone]            FINAL IMPRESSION   1. Spinal puncture headache

## 2022-06-06 NOTE — CONSULTS
Patient reports a difficult LP on Tuesday May 31.  The headache started on Thursday June 2nd.  The headache starts frontal and goes into the patients neck.  It is associated with ringing of the ears.  The headache is worse when sitting or standing.      Physical:  HEENT: unremarkable                    Neuro: unremarkable                     CV:  RRR without murmur.                     Lungs :CTA    Assessment:  Post dural puncture headache.  The patient has a classic PDPH.  The treatment is an Epidural blood Patch.  Procedure, alternatives, and complications were described to the patient.  She wants to proceed with the Epidural blood patch.         17:12  I stood the patient up slowly and she reports her headache is completely relieved.  The patient knows to come back to the ER if she has signs of infection or shooting pains down her legs.  I gave her my cell phone number and she knows to call if the headache returns or if she has any questions.

## 2022-06-06 NOTE — ED TRIAGE NOTES
Pt ambulates to triage  Chief Complaint   Patient presents with   • Head Ache   • Nausea   • Sent by MD     Headaches since the beginning of May, worsening, told to come back by MD, spinal tap on Tuesday   Pt A & 0 x 4, speech clear, ambulates well    Pt updated on triage process and asked to inform RN of any changes while waiting in lobby.

## 2022-06-06 NOTE — ANESTHESIA PROCEDURE NOTES
Blood Patch  Performed by: Gonzalez Ortiz M.D.  Authorized by: Gonzalez Ortiz M.D.     Patient Location:  ED  Start Time:  6/6/2022 4:15 PM  End Time:  6/6/2022 4:30 PM  Reason for Blood Patch: spinal headache    2 patient identifiers, IV checked, site marked, risks and benefits discussed, surgical consent, monitors and equipment checked, pre-op evaluation, timeout performed and anesthesia consent    Volume of Blood Injected:  20  Patient Position:  Sitting  Prep: povidone-iodine, patient draped and sterile technique    Monitoring:  Continuous pulse oximetry and blood pressure monitoring  Approach:  Midline  Location:  L3-4  Injection Technique:  NIURKA air  Injection Method:  Touhy needle  Needle Gauge:  17  Needle Length (in):  3.5  Loss of Resistance:  5  Evidence of CSF/Blood?:  No  Venous Blood Drawn By::  Nurse  Sterile Technique on Blood Draw?:  Yes  Volume:  20  Events: well tolerated

## 2022-06-07 NOTE — ED NOTES
Patient given discharge instructions, questions and concerns addressed.  Patient ambulated out with mom

## 2023-04-04 ENCOUNTER — OFFICE VISIT (OUTPATIENT)
Dept: URGENT CARE | Facility: CLINIC | Age: 23
End: 2023-04-04
Payer: COMMERCIAL

## 2023-04-04 VITALS
HEIGHT: 66 IN | TEMPERATURE: 98.7 F | DIASTOLIC BLOOD PRESSURE: 78 MMHG | RESPIRATION RATE: 14 BRPM | SYSTOLIC BLOOD PRESSURE: 116 MMHG | HEART RATE: 58 BPM | OXYGEN SATURATION: 98 % | BODY MASS INDEX: 28.93 KG/M2 | WEIGHT: 180 LBS

## 2023-04-04 DIAGNOSIS — M54.50 ACUTE LEFT-SIDED LOW BACK PAIN WITHOUT SCIATICA: Primary | ICD-10-CM

## 2023-04-04 DIAGNOSIS — R30.0 DYSURIA: ICD-10-CM

## 2023-04-04 LAB
APPEARANCE UR: CLEAR
BILIRUB UR STRIP-MCNC: NEGATIVE MG/DL
COLOR UR AUTO: NORMAL
GLUCOSE UR STRIP.AUTO-MCNC: NEGATIVE MG/DL
KETONES UR STRIP.AUTO-MCNC: NEGATIVE MG/DL
LEUKOCYTE ESTERASE UR QL STRIP.AUTO: NEGATIVE
NITRITE UR QL STRIP.AUTO: NEGATIVE
PH UR STRIP.AUTO: 7 [PH] (ref 5–8)
POCT INT CON NEG: NEGATIVE
POCT INT CON POS: POSITIVE
POCT URINE PREGNANCY TEST: NEGATIVE
PROT UR QL STRIP: NEGATIVE MG/DL
RBC UR QL AUTO: NEGATIVE
SP GR UR STRIP.AUTO: 1.01
UROBILINOGEN UR STRIP-MCNC: 0.2 MG/DL

## 2023-04-04 PROCEDURE — 81002 URINALYSIS NONAUTO W/O SCOPE: CPT | Performed by: NURSE PRACTITIONER

## 2023-04-04 PROCEDURE — 99203 OFFICE O/P NEW LOW 30 MIN: CPT | Performed by: NURSE PRACTITIONER

## 2023-04-04 PROCEDURE — 81025 URINE PREGNANCY TEST: CPT | Performed by: NURSE PRACTITIONER

## 2023-04-04 RX ORDER — NORETHINDRONE ACETATE AND ETHINYL ESTRADIOL 1.5; .03 MG/1; MG/1
TABLET ORAL
COMMUNITY
Start: 2023-02-12

## 2023-04-04 ASSESSMENT — FIBROSIS 4 INDEX: FIB4 SCORE: 0.37

## 2023-04-05 NOTE — PROGRESS NOTES
Mary Harris is a 22 y.o. female who presents for UTI (X1days, Back pain on left side, urinary frequency, )      HPI  This is a new problem. Mary Harris is a 22 y.o. patient who presents to urgent care with c/o: Back pain x 1 day.  Feels like it may be UTI. Urinary frequency. No malodor or vaginal discharge.  She is traveling next week and wants to make sure it is not bladder infection.   Treatments tried: heat, cold, ibuprofen   No other aggravating or alleviating factors.       ROS See HPI    Allergies:       Allergies   Allergen Reactions    Food Swelling     Cactus, nectarines    Food Allergy Formula      Peaches         PMSFS Hx:  History reviewed. No pertinent past medical history.  History reviewed. No pertinent surgical history.  History reviewed. No pertinent family history.  Social History     Tobacco Use    Smoking status: Never    Smokeless tobacco: Never   Substance Use Topics    Alcohol use: Yes     Comment: occ       Problems:   Patient Active Problem List   Diagnosis    COVID-19 long hauler    Depression    Headache    Vitamin D deficiency       Medications:   Current Outpatient Medications on File Prior to Visit   Medication Sig Dispense Refill    DAVID 1.5/30 1.5-30 MG-MCG Tab       fluoxetine (PROZAC) 40 MG capsule Take 40 mg by mouth every day.      EPINEPHrine 0.3 MG/0.3ML Solution Prefilled Syringe Inject 0.3 mL into the shoulder, thigh, or buttocks one time as needed (for anaphylaxis) for up to 2 doses. 2 Each 0    vitamin D3 2000 UNIT Tab Take 1 Tablet by mouth every day. (Patient not taking: Reported on 4/4/2023) 30 Tablet 6    ascorbic acid (VITAMIN C) 500 MG tablet Take 1 Tablet by mouth every day. (Patient not taking: Reported on 4/4/2023) 30 Tablet 6    ibuprofen (MOTRIN) 200 MG Tab Take 400 mg by mouth every 6 hours as needed. (Patient not taking: Reported on 4/4/2023)      acetaminophen (TYLENOL) 500 MG Tab Take 500-1,000 mg by mouth every 6 hours as needed. (Patient not  "taking: Reported on 4/4/2023)      Norethindrone Acet-Ethinyl Est (DAVID 1.5/30 PO) Take  by mouth. (Patient not taking: Reported on 4/4/2023)       No current facility-administered medications on file prior to visit.          Objective:     /78   Pulse (!) 58   Temp 37.1 °C (98.7 °F) (Temporal)   Resp 14   Ht 1.676 m (5' 6\")   Wt 81.6 kg (180 lb)   SpO2 98%   BMI 29.05 kg/m²     Physical Exam  Vitals and nursing note reviewed.   Constitutional:       Appearance: Normal appearance.   Cardiovascular:      Rate and Rhythm: Normal rate.      Pulses: Normal pulses.      Heart sounds: Normal heart sounds.   Pulmonary:      Effort: Pulmonary effort is normal.      Breath sounds: Normal breath sounds.   Abdominal:      Tenderness: There is no right CVA tenderness or left CVA tenderness.   Musculoskeletal:      Cervical back: Neck supple.      Lumbar back: Tenderness present. No swelling, edema or bony tenderness. Normal range of motion. Negative right straight leg raise test and negative left straight leg raise test.        Back:    Skin:     General: Skin is warm and dry.      Capillary Refill: Capillary refill takes less than 2 seconds.      Findings: No erythema or rash.   Neurological:      Mental Status: She is alert and oriented to person, place, and time.   Psychiatric:         Mood and Affect: Mood normal.         Behavior: Behavior normal.         Thought Content: Thought content normal.     Results for orders placed or performed in visit on 04/04/23   POCT Urinalysis   Result Value Ref Range    POC Color light yellow Negative    POC Appearance clear Negative    POC Glucose negative Negative mg/dL    POC Bilirubin negative Negative mg/dL    POC Ketones negative Negative mg/dL    POC Specific Gravity 1.015 <1.005 - >1.030    POC Blood negative Negative    POC Urine PH 7.0 5.0 - 8.0    POC Protein negative Negative mg/dL    POC Urobiligen 0.2 Negative (0.2) mg/dL    POC Nitrites negative Negative    " POC Leukocyte Esterase negative Negative         Assessment /Associated Orders:      1. Acute left-sided low back pain without sciatica        2. Dysuria  POCT Urinalysis    POCT Pregnancy          Medical Decision Making:    Pt is clinically stable at today's acute urgent care visit.  No acute distress noted. Appropriate for outpatient care at this time.   Acute problem today with uncertain prognosis.  No evidence of acute cystitis today. Normal urinalysis. Negative pregnancy.   OTC  analgesic of choice (acetaminophen or NSAID) prn pain. Follow manufactures dosing and safety precautions.   Consider unusual activities as aggravating factors     Ice or heat packs.  Prn pain    Discussed Dx, management options (risks,benefits, and alternatives to planned treatment), natural progression and supportive care.  Expressed understanding and the treatment plan was agreed upon.   Questions were encouraged and answered   Return to urgent care prn if new or worsening sx or if there is no improvement in condition prn.            Please note that this dictation was created using voice recognition software. I have worked with consultants from the vendor as well as technical experts from [facility] [employer] to optimize the interface. I have made every reasonable attempt to correct obvious errors, but I expect that there are errors of grammar and possibly content that I did not discover before finalizing the note.  This note was electronically signed by provider

## 2023-05-15 ENCOUNTER — OFFICE VISIT (OUTPATIENT)
Dept: URGENT CARE | Facility: CLINIC | Age: 23
End: 2023-05-15
Payer: COMMERCIAL

## 2023-05-15 VITALS
RESPIRATION RATE: 20 BRPM | OXYGEN SATURATION: 97 % | SYSTOLIC BLOOD PRESSURE: 122 MMHG | DIASTOLIC BLOOD PRESSURE: 60 MMHG | HEIGHT: 66 IN | BODY MASS INDEX: 28.93 KG/M2 | HEART RATE: 78 BPM | TEMPERATURE: 98.4 F | WEIGHT: 180 LBS

## 2023-05-15 DIAGNOSIS — L05.91 PILONIDAL CYST: ICD-10-CM

## 2023-05-15 PROCEDURE — 99213 OFFICE O/P EST LOW 20 MIN: CPT | Performed by: PHYSICIAN ASSISTANT

## 2023-05-15 PROCEDURE — 3074F SYST BP LT 130 MM HG: CPT | Performed by: PHYSICIAN ASSISTANT

## 2023-05-15 PROCEDURE — 3078F DIAST BP <80 MM HG: CPT | Performed by: PHYSICIAN ASSISTANT

## 2023-05-15 RX ORDER — SULFAMETHOXAZOLE AND TRIMETHOPRIM 800; 160 MG/1; MG/1
1 TABLET ORAL 2 TIMES DAILY
Qty: 14 TABLET | Refills: 0 | Status: SHIPPED | OUTPATIENT
Start: 2023-05-15 | End: 2023-05-19

## 2023-05-15 ASSESSMENT — ENCOUNTER SYMPTOMS
CHILLS: 0
FEVER: 0
TINGLING: 0

## 2023-05-15 ASSESSMENT — FIBROSIS 4 INDEX: FIB4 SCORE: 0.37

## 2023-05-15 NOTE — LETTER
May 15, 2023         Patient: Mary Harris   YOB: 2000   Date of Visit: 5/15/2023           To Whom it May Concern:    Mary Harris was seen in my clinic on 5/15/2023.  Please excuse patient's absence this morning.    If you have any questions or concerns, please don't hesitate to call.        Sincerely,           Daniele Mejia P.A.-C.  Electronically Signed

## 2023-05-15 NOTE — PROGRESS NOTES
"  Subjective:   Mary Harris is a 22 y.o. female who presents today with   Chief Complaint   Patient presents with    Bump     \"Cyst\" on tailbone x 1 week, redness, painful/tender to the touch, painful to sit       Letter for School/Work     X today     Other  This is a new problem. The current episode started in the past 7 days. The problem occurs constantly. The problem has been unchanged. Pertinent negatives include no chills or fever. Treatments tried: warm compress. The treatment provided no relief.     No red flag signs noted on exam.  No recent injury or trauma to the area.    PMH:  has no past medical history on file.  MEDS:   Current Outpatient Medications:     sulfamethoxazole-trimethoprim (BACTRIM DS) 800-160 MG tablet, Take 1 Tablet by mouth 2 times a day for 7 days., Disp: 14 Tablet, Rfl: 0    DAVID 1.5/30 1.5-30 MG-MCG Tab, , Disp: , Rfl:     fluoxetine (PROZAC) 40 MG capsule, Take 40 mg by mouth every day., Disp: , Rfl:     EPINEPHrine 0.3 MG/0.3ML Solution Prefilled Syringe, Inject 0.3 mL into the shoulder, thigh, or buttocks one time as needed (for anaphylaxis) for up to 2 doses., Disp: 2 Each, Rfl: 0  ALLERGIES:   Allergies   Allergen Reactions    Food Swelling     Cactus, nectarines    Food Allergy Formula      Peaches       SURGHX: History reviewed. No pertinent surgical history.  SOCHX:  reports that she has never smoked. She has never used smokeless tobacco. She reports current alcohol use. She reports that she does not use drugs.  FH: Reviewed with patient, not pertinent to this visit.     Review of Systems   Constitutional:  Negative for chills and fever.   Skin:         Cyst/abscess to gluteal area   Neurological:  Negative for tingling.      Objective:   /60 (BP Location: Left arm, Patient Position: Sitting, BP Cuff Size: Adult)   Pulse 78   Temp 36.9 °C (98.4 °F) (Temporal)   Resp 20   Ht 1.676 m (5' 6\")   Wt 81.6 kg (180 lb)   SpO2 97%   BMI 29.05 kg/m²   Physical " Exam  Vitals and nursing note reviewed. Exam conducted with a chaperone present.   Constitutional:       General: She is not in acute distress.     Appearance: Normal appearance. She is well-developed. She is not ill-appearing or toxic-appearing.   HENT:      Head: Normocephalic and atraumatic.      Right Ear: Hearing normal.      Left Ear: Hearing normal.   Cardiovascular:      Rate and Rhythm: Normal rate.   Pulmonary:      Effort: Pulmonary effort is normal.   Musculoskeletal:      Comments: Normal movement in all 4 extremities   Skin:     General: Skin is warm and dry.             Comments: Slight erythema approximately 1 to 2 cm in diameter with no induration or fluctuance to the left gluteal cleft.  Tenderness to palpation to the area directly but no other streaking or redness spreading from the area.  No open drainage or discharge   Neurological:      Mental Status: She is alert.      Coordination: Coordination normal.   Psychiatric:         Mood and Affect: Mood normal.       Female medical assistant chaperone present today.    Assessment/Plan:   Assessment    1. Pilonidal cyst  - Referral to General Surgery  - sulfamethoxazole-trimethoprim (BACTRIM DS) 800-160 MG tablet; Take 1 Tablet by mouth 2 times a day for 7 days.  Dispense: 14 Tablet; Refill: 0    Symptoms and presentation appear to be consistent with pilonidal cyst.  Discussed with patient that the cyst/area of infection is not quite ready for incision and drainage today as there is no fluctuance or induration noted.  Would recommend continue with warm compress to the area and we will start her on an antibiotic.  Recommend following up with general surgeon or sooner if the area seems to be getting any worse or more tender.    Differential diagnosis, natural history, supportive care, and indications for immediate follow-up discussed.   Patient given instructions and understanding of medications and treatment.    If not improving in 3-5 days, F/U with  PCP or return to UC if symptoms worsen.    Patient agreeable to plan.      Please note that this dictation was created using voice recognition software. I have made every reasonable attempt to correct obvious errors, but I expect that there are errors of grammar and possibly content that I did not discover before finalizing the note.    Daniele Mejia PA-C

## 2023-05-19 ENCOUNTER — HOSPITAL ENCOUNTER (EMERGENCY)
Facility: MEDICAL CENTER | Age: 23
End: 2023-05-19
Attending: EMERGENCY MEDICINE
Payer: COMMERCIAL

## 2023-05-19 VITALS
WEIGHT: 182.1 LBS | TEMPERATURE: 98.5 F | OXYGEN SATURATION: 96 % | HEART RATE: 70 BPM | BODY MASS INDEX: 29.39 KG/M2 | RESPIRATION RATE: 15 BRPM | SYSTOLIC BLOOD PRESSURE: 122 MMHG | DIASTOLIC BLOOD PRESSURE: 72 MMHG

## 2023-05-19 DIAGNOSIS — L05.01 PILONIDAL CYST WITH ABSCESS: ICD-10-CM

## 2023-05-19 PROCEDURE — 99282 EMERGENCY DEPT VISIT SF MDM: CPT

## 2023-05-19 PROCEDURE — 304217 HCHG IRRIGATION SYSTEM

## 2023-05-19 PROCEDURE — 303977 HCHG I & D

## 2023-05-19 PROCEDURE — 700111 HCHG RX REV CODE 636 W/ 250 OVERRIDE (IP): Performed by: EMERGENCY MEDICINE

## 2023-05-19 RX ORDER — SULFAMETHOXAZOLE AND TRIMETHOPRIM 800; 160 MG/1; MG/1
1 TABLET ORAL EVERY 12 HOURS
Qty: 14 TABLET | Refills: 0 | Status: ACTIVE | OUTPATIENT
Start: 2023-05-19 | End: 2023-05-26

## 2023-05-19 RX ORDER — ONDANSETRON 4 MG/1
4 TABLET, ORALLY DISINTEGRATING ORAL EVERY 6 HOURS PRN
Qty: 10 TABLET | Refills: 0 | Status: SHIPPED | OUTPATIENT
Start: 2023-05-19 | End: 2023-09-21

## 2023-05-19 RX ORDER — CEPHALEXIN 500 MG/1
500 CAPSULE ORAL 4 TIMES DAILY
Qty: 28 CAPSULE | Refills: 0 | Status: ACTIVE | OUTPATIENT
Start: 2023-05-19 | End: 2023-09-21

## 2023-05-19 RX ADMIN — LIDOCAINE HYDROCHLORIDE 20 ML: 10 INJECTION, SOLUTION EPIDURAL; INFILTRATION; INTRACAUDAL at 20:30

## 2023-05-19 ASSESSMENT — FIBROSIS 4 INDEX: FIB4 SCORE: 0.37

## 2023-05-20 NOTE — DISCHARGE INSTRUCTIONS
Manipulate the loop twice daily and soapy bath.  Directed.  Return in 1 week for wound check and loop removal.  Return sooner for worsening pain, fevers, redness, swelling, any other acute symptom change or concern.

## 2023-05-20 NOTE — ED TRIAGE NOTES
.  Chief Complaint   Patient presents with    Sent by MD    Cyst     Seen Monday dx with pilonidal cyst. Taking abx as prescribed     Ambulated to triage. Pt seen at  [facility]x. Unable to be seen until second week of June. Increased size and pain of cyst.

## 2023-05-20 NOTE — ED NOTES
Wound dressed. Additional supplies given to pt for home. Discharge instructions discussed with pt, verbalizes understanding. All belongings with pt when leaving unit. Pt amb to lobby steady gait.

## 2023-05-20 NOTE — ED PROVIDER NOTES
ED Provider Note    CHIEF COMPLAINT  Chief Complaint   Patient presents with    Sent by MD    Cyst     Seen Monday dx with pilonidal cyst. Taking abx as prescribed       EXTERNAL RECORDS REVIEWED  Outpatient Notes urgent care note from 5 days prior    HPI/ROS  LIMITATION TO HISTORY   Select: : None  OUTSIDE HISTORIAN(S):  Family  at bedside    Mary Harris is a 22 y.o. female who presents to the emergency department with chief complaint of pilonidal abscess.  Seen in urgent treatment center 4 days prior for the same and was placed on Bactrim.  She reports that since then she had increasing pain swelling redness slight chills no measured fever no drainage.    PAST MEDICAL HISTORY   Patient denies    SURGICAL HISTORY  patient denies any surgical history    FAMILY HISTORY  No family history on file.    SOCIAL HISTORY  Social History     Tobacco Use    Smoking status: Never    Smokeless tobacco: Never   Vaping Use    Vaping Use: Never used   Substance and Sexual Activity    Alcohol use: Yes     Comment: occ    Drug use: No    Sexual activity: Not on file       CURRENT MEDICATIONS  Home Medications       Reviewed by Page Dong R.N. (Registered Nurse) on 05/19/23 at 1853  Med List Status: Partial     Medication Last Dose Status   EPINEPHrine 0.3 MG/0.3ML Solution Prefilled Syringe  Active   fluoxetine (PROZAC) 40 MG capsule  Active   DAVID 1.5/30 1.5-30 MG-MCG Tab  Active   sulfamethoxazole-trimethoprim (BACTRIM DS) 800-160 MG tablet 5/19/2023 Active                    ALLERGIES  Allergies   Allergen Reactions    Food Swelling     Cactus, nectarines    Food Allergy Formula      Peaches         PHYSICAL EXAM  VITAL SIGNS: /82   Pulse 81   Temp 36.4 °C (97.6 °F) (Temporal)   Resp 14   Wt 82.6 kg (182 lb 1.6 oz)   LMP 04/27/2023   SpO2 96%   BMI 29.39 kg/m²    Pulse ox interpretation: I interpret this pulse ox as normal.  Constitutional: Alert and oriented x 3, Distress  HEENT: Atraumatic  normocephalic, pupils are equal round reactive to light extraocular movements are intact. The nares is clear, external ears are normal, mouth shows moist mucous membranes normal dentition for age  Neck: Supple, no JVD no tracheal deviation  Cardiovascular: Regular rate and rhythm   Thorax & Lungs: No respiratory distress   GI: Soft nontender nondistended positive bowel sounds, no peritoneal signs  Skin: Patient has approximately 5 cm area of erythema induration and warmth at the crest of the gluteal cleft.  More to the left side than the right.  There is approximately 4 cm of central fluctuance no active drainage  Musculoskeletal: Moving all extremities with full range and 5 of 5 strength no acute  deformity  Neurologic: Cranial nerves III through XII are grossly intact no sensory deficit no cerebellar dysfunction   Psychiatric: Appropriate affect for situation at this time          DIAGNOSTIC STUDIES / PROCEDURES      COURSE & MEDICAL DECISION MAKING    ED Observation Status? No; Patient does not meet criteria for ED Observation.     INITIAL ASSESSMENT, COURSE AND PLAN  Care Narrative: Patient already on antibiotics vital signs are unremarkable physical exam consistent with acute infected pilonidal abscess surrounding cellulitis.  Abscess incised and drained as below placed on Bactrim Keflex given prescription for Zofran.  Return for worsening pain fevers nausea vomiting any other acute symptom change or concern otherwise discharged in stable and improved condition.    Incision and Drainage Procedure Note    Indication: Pilonidal abscess    Procedure: The patient was positioned appropriately and the skin over the incision site was prepped with betadine and draped in a sterile fashion. Local anesthesia was obtained by infiltration using 1% Lidocaine without epinephrine.  2 opposing 1 cm stab incisions were made at the borders of the fluctuance and approximately 20 cc of foul smelling and purulent material was  expressed. Loculations were broken up using forceps and more of the material was able to be expressed.  Needle  was then placed through the wound cavity through opposing stab incisions and blue maxi vessel loop was pulled through the wound and tied off to maintain drainage cavity.  The drainage cavity was then irrigated with 500 cc high-pressure and normal saline.     The patient tolerated the procedure well.    Complications: None      ADDITIONAL PROBLEM LIST    DISPOSITION AND DISCUSSIONS    I have discussed management of the patient with the following physicians and ELMER's:      Discussion of management with other QHP or appropriate source(s): None     Escalation of care considered, and ultimately not performed:blood analysis and diagnostic imaging    Barriers to care at this time, including but not limited to: .     Decision tools and prescription drugs considered including, but not limited to: Antibiotics   .    FINAL IMPRESSION  1. Pilonidal cyst with abscess    2.  Abscess incision and drainage by ERP    PRESCRIPTIONS  Discharge Medication List as of 5/19/2023  8:56 PM        START taking these medications    Details   cephALEXin (KEFLEX) 500 MG Cap Take 1 Capsule by mouth 4 times a day., Disp-28 Capsule, R-0, Normal      ondansetron (ZOFRAN ODT) 4 MG TABLET DISPERSIBLE Take 1 Tablet by mouth every 6 hours as needed for Nausea/Vomiting., Disp-10 Tablet, R-0, Normal             FOLLOW UP  [facility]  [address]  [facility] [address]  [phone]    Schedule an appointment as soon as possible for a visit       [facility], Emergency Dept  [address] [facility] [address] [phone]    For suture removal, For wound re-check        -DISCHARGE-

## 2023-05-26 ENCOUNTER — HOSPITAL ENCOUNTER (EMERGENCY)
Facility: MEDICAL CENTER | Age: 23
End: 2023-05-26
Attending: EMERGENCY MEDICINE
Payer: COMMERCIAL

## 2023-05-26 VITALS
OXYGEN SATURATION: 95 % | RESPIRATION RATE: 18 BRPM | TEMPERATURE: 98 F | WEIGHT: 181.66 LBS | DIASTOLIC BLOOD PRESSURE: 86 MMHG | BODY MASS INDEX: 29.2 KG/M2 | HEART RATE: 80 BPM | SYSTOLIC BLOOD PRESSURE: 136 MMHG | HEIGHT: 66 IN

## 2023-05-26 DIAGNOSIS — Z48.00 ABSCESS PACKING REMOVAL: ICD-10-CM

## 2023-05-26 PROCEDURE — 99282 EMERGENCY DEPT VISIT SF MDM: CPT

## 2023-05-26 ASSESSMENT — FIBROSIS 4 INDEX: FIB4 SCORE: 0.37

## 2023-05-26 NOTE — ED TRIAGE NOTES
"Chief Complaint   Patient presents with    Wound Check     Pt had I&D done on a pilonidal cyst on 05/19 and returns today for packing removal.      /83   Pulse 68   Temp 36.2 °C (97.2 °F) (Temporal)   Resp 16   Ht 1.676 m (5' 6\")   Wt 82.4 kg (181 lb 10.5 oz)   LMP 04/27/2023   SpO2 98%   BMI 29.32 kg/m²     Pt ambulatory to triage for above.   "

## 2023-05-27 NOTE — ED PROVIDER NOTES
ER Provider Note    Scribed for Lorenzo Washington M.d. by Any Phillip. 5/26/2023  5:22 PM    Primary Care Provider: Steph Kee P.A.-C.    CHIEF COMPLAINT  Chief Complaint   Patient presents with    Wound Check     Pt had I&D done on a pilonidal cyst on 05/19 and returns today for packing removal.          HPI/ROS  LIMITATION TO HISTORY   Select: : None  OUTSIDE HISTORIAN(S):  None    Mary Harris is a 22 y.o. female who presents to the ED for a wound check onset 5/19. She adds that she originally was planned to see a surgeon, but could not get an appointment until June. So, with this she went to [facility] ED on 5/19. The patient notes that she had an incision and drainage done to the would on 5/19/23, and has returned today to follow up and remove the packing. She notes that the would was a pilonidal cyst. She denies any new symptoms since this date.    PAST MEDICAL HISTORY  History reviewed. No pertinent past medical history.    SURGICAL HISTORY  History reviewed. No pertinent surgical history.    FAMILY HISTORY  History reviewed. No pertinent family history.    SOCIAL HISTORY   reports that she has never smoked. She has never used smokeless tobacco. She reports current alcohol use. She reports that she does not use drugs.    CURRENT MEDICATIONS  Previous Medications    CEPHALEXIN (KEFLEX) 500 MG CAP    Take 1 Capsule by mouth 4 times a day.    EPINEPHRINE 0.3 MG/0.3ML SOLUTION PREFILLED SYRINGE    Inject 0.3 mL into the shoulder, thigh, or buttocks one time as needed (for anaphylaxis) for up to 2 doses.    FLUOXETINE (PROZAC) 40 MG CAPSULE    Take 40 mg by mouth every day.    DAVID 1.5/30 1.5-30 MG-MCG TAB        ONDANSETRON (ZOFRAN ODT) 4 MG TABLET DISPERSIBLE    Take 1 Tablet by mouth every 6 hours as needed for Nausea/Vomiting.    SULFAMETHOXAZOLE-TRIMETHOPRIM (BACTRIM DS) 800-160 MG TABLET    Take 1 Tablet by mouth every 12 hours for 7 days.       ALLERGIES  Food and Food allergy formula    PHYSICAL  "EXAM  /83   Pulse 68   Temp 36.2 °C (97.2 °F) (Temporal)   Resp 16   Ht 1.676 m (5' 6\")   Wt 82.4 kg (181 lb 10.5 oz)   LMP 04/27/2023   SpO2 98%   BMI 29.32 kg/m²   Constitutional: Well developed, Well nourished, No acute distress, Non-toxic appearance.   HENT: Normocephalic, Atraumatic, Bilateral external ears normal, Oropharynx is clear mucous membranes are moist. No oral exudates or nasal discharge.   Eyes: Pupils are equal round and reactive, EOMI, Conjunctiva normal, No discharge.   Neck: Normal range of motion, No tenderness, Supple, No stridor. No meningismus.  Lymphatic: No lymphadenopathy noted.   Cardiovascular: Regular rate and rhythm without murmur rub or gallop.  Thorax & Lungs: Clear breath sounds bilaterally without wheezes, rhonchi or rales. There is no chest wall tenderness.   Abdomen: Soft non-tender non-distended. There is no rebound or guarding. No organomegaly is appreciated. Bowel sounds are normal.  Skin: Normal without rash.   Back: Loop packing in a pilonidal cyst over the sacrum and upper gluteal fold. No swelling, edema, or redness to the area. No CVA or spinal tenderness.   Extremities: Intact distal pulses, No edema, No tenderness, No cyanosis, No clubbing. Capillary refill is less than 2 seconds.  Musculoskeletal: Good range of motion in all major joints. No tenderness to palpation or major deformities noted.   Neurologic: Alert & oriented x 3, Normal motor function, Normal sensory function, No focal deficits noted. Reflexes are normal.  Psychiatric: Affect normal, Judgment normal, Mood normal. There is no suicidal ideation or patient reported hallucinations.     COURSE & MEDICAL DECISION MAKING     ED Observation Status? No; Patient does not meet criteria for ED Observation.     INITIAL ASSESSMENT, COURSE AND PLAN  Care Narrative:   5:22 PM - Patient seen and examined at bedside. Discussed plan of care, including removing packing and evaluation of the wound. Patient " agrees to the plan of care. I removed the packing which showed no edema or redness to the area. The plan for discharge was discussed. Patient and/or family was given the opportunity to ask any questions. Patient and/or family verbalizes understanding and agreement to this plan of care.           DISPOSITION AND DISCUSSIONS  I have discussed management of the patient with the following physicians and ELMER's:  None    Discussion of management with other QHP or appropriate source(s): None     Escalation of care considered, and ultimately not performed: the patient was evaluated by myself, after discussion I have recommended the patient to be discharged, IV fluids, blood analysis, and diagnostic imaging.    Barriers to care at this time, including but not limited to:  None .       DISPOSITION:  Patient will be discharged home in stable condition.    FINAL DIANGOSIS  1. Abscess packing removal       Any BURROWS (Paulieibe), am scribing for, and in the presence of, Lorenzo Washington M.D..    Electronically signed by: Any Phillip (Paulieibe), 5/26/2023    Lorenzo BURROWS M.D. personally performed the services described in this documentation, as scribed by Any Phillip in my presence, and it is both accurate and complete.      The note accurately reflects work and decisions made by me.  Lorenzo Washington M.D.  5/26/2023  8:41 PM

## 2023-09-21 ENCOUNTER — OFFICE VISIT (OUTPATIENT)
Dept: URGENT CARE | Facility: PHYSICIAN GROUP | Age: 23
End: 2023-09-21
Payer: COMMERCIAL

## 2023-09-21 VITALS
WEIGHT: 181 LBS | HEART RATE: 95 BPM | DIASTOLIC BLOOD PRESSURE: 80 MMHG | OXYGEN SATURATION: 99 % | TEMPERATURE: 98.7 F | BODY MASS INDEX: 29.09 KG/M2 | HEIGHT: 66 IN | SYSTOLIC BLOOD PRESSURE: 108 MMHG | RESPIRATION RATE: 16 BRPM

## 2023-09-21 DIAGNOSIS — J02.9 VIRAL PHARYNGITIS: ICD-10-CM

## 2023-09-21 LAB — S PYO DNA SPEC NAA+PROBE: NOT DETECTED

## 2023-09-21 PROCEDURE — 87651 STREP A DNA AMP PROBE: CPT

## 2023-09-21 PROCEDURE — 3074F SYST BP LT 130 MM HG: CPT

## 2023-09-21 PROCEDURE — 99213 OFFICE O/P EST LOW 20 MIN: CPT

## 2023-09-21 PROCEDURE — 3079F DIAST BP 80-89 MM HG: CPT

## 2023-09-21 ASSESSMENT — FIBROSIS 4 INDEX: FIB4 SCORE: 0.37

## 2023-09-21 NOTE — PROGRESS NOTES
"Chief Complaint   Patient presents with    Headache     That started on Tuesday. Now has a sore throat, ear pain, and fever.          Subjective:   HISTORY OF PRESENT ILLNESS: Mary Harris is a 22 y.o. female who presents for sore throat, body aches and ear pain that started 3 days ago.  She reports her throat is like sandpaper.   Denies difficulty swallowing or drooling      Medications, Allergies, current problem list, Social and Family history reviewed today in Epic.     Objective:     /80 (BP Location: Left arm, Patient Position: Sitting, BP Cuff Size: Adult long)   Pulse 95   Temp 37.1 °C (98.7 °F) (Temporal)   Resp 16   Ht 1.676 m (5' 6\")   Wt 82.1 kg (181 lb)   SpO2 99%     Physical Exam  Vitals reviewed.   Constitutional:       Appearance: Normal appearance. She is not ill-appearing or toxic-appearing.   HENT:      Head:      Jaw: No trismus.      Right Ear: Tympanic membrane normal.      Left Ear: Tympanic membrane normal.      Nose: Rhinorrhea present.      Mouth/Throat:      Mouth: Mucous membranes are moist.      Pharynx: Uvula midline. Posterior oropharyngeal erythema present. No pharyngeal swelling, oropharyngeal exudate or uvula swelling.      Tonsils: No tonsillar exudate or tonsillar abscesses. 1+ on the right. 1+ on the left.   Eyes:      Conjunctiva/sclera: Conjunctivae normal.   Cardiovascular:      Rate and Rhythm: Normal rate and regular rhythm.      Heart sounds: Normal heart sounds.   Pulmonary:      Effort: Pulmonary effort is normal. No respiratory distress.      Breath sounds: Normal breath sounds. No decreased breath sounds or wheezing.   Musculoskeletal:      Cervical back: Full passive range of motion without pain and neck supple.   Skin:     General: Skin is warm and dry.   Neurological:      Mental Status: She is alert and oriented to person, place, and time.   Psychiatric:         Mood and Affect: Mood normal.          Assessment/Plan:     Diagnosis and associated " orders    I personally reviewed prior external notes and test results pertinent to today's visit.   Results for orders placed or performed in visit on 09/21/23   POCT CEPHEID GROUP A STREP - PCR   Result Value Ref Range    POC Group A Strep, PCR Not Detected Not Detected, Invalid       1. Viral pharyngitis  POCT CEPHEID GROUP A STREP - PCR                IMPRESSION: Patient is non-toxic appearing and suitable for outpatient care at this time.  Symptoms and exam are consistent with viral pharyngitis.   Exam findings reassuring with stable vital signs,  no pharyngeal swelling or exudate.  Differential diagnosis discussed. We will treat with OTC symptomatic relief measures as discussed, Supportive care also discussed to include the use of warm salt water gargles, steam inhalation, and the use of a cool-mist humidifier in the bedroom at night.       Educated on red flag symptoms and Instructed patient to return to Urgent Care or nearest Emergency Department if symptoms fail to improve, for any change in condition, further concerns, or new concerning symptoms. Patient states understanding of the plan of care and discharge instructions.  They are discharged in stable condition.         Please note that this dictation was created using voice recognition software. I have made a reasonable attempt to correct obvious errors, but I expect that there are errors of grammar and possibly content that I did not discover before finalizing the note.    This note was electronically signed by KATHY Lopez

## 2023-09-21 NOTE — LETTER
September 21, 2023    To Whom It May Concern:         This is confirmation that Mary Harris attended her scheduled appointment with KATHY Lopez on 9/21/23. May return 9/23         If you have any questions please do not hesitate to call me at the phone number listed below.    Sincerely,          DANIEL Lopez.  [phone]

## 2024-04-22 ENCOUNTER — APPOINTMENT (OUTPATIENT)
Dept: RADIOLOGY | Facility: MEDICAL CENTER | Age: 24
End: 2024-04-22
Attending: EMERGENCY MEDICINE
Payer: COMMERCIAL

## 2024-04-22 ENCOUNTER — HOSPITAL ENCOUNTER (EMERGENCY)
Facility: MEDICAL CENTER | Age: 24
End: 2024-04-22
Attending: EMERGENCY MEDICINE
Payer: COMMERCIAL

## 2024-04-22 VITALS
DIASTOLIC BLOOD PRESSURE: 74 MMHG | HEART RATE: 74 BPM | BODY MASS INDEX: 30.86 KG/M2 | SYSTOLIC BLOOD PRESSURE: 120 MMHG | OXYGEN SATURATION: 98 % | TEMPERATURE: 97 F | HEIGHT: 66 IN | WEIGHT: 192.02 LBS | RESPIRATION RATE: 15 BRPM

## 2024-04-22 DIAGNOSIS — N93.9 ABNORMAL VAGINAL BLEEDING: ICD-10-CM

## 2024-04-22 LAB
ALBUMIN SERPL BCP-MCNC: 4.4 G/DL (ref 3.2–4.9)
ALBUMIN/GLOB SERPL: 1.5 G/DL
ALP SERPL-CCNC: 43 U/L (ref 30–99)
ALT SERPL-CCNC: 10 U/L (ref 2–50)
ANION GAP SERPL CALC-SCNC: 12 MMOL/L (ref 7–16)
AST SERPL-CCNC: 16 U/L (ref 12–45)
BASOPHILS # BLD AUTO: 0.2 % (ref 0–1.8)
BASOPHILS # BLD: 0.02 K/UL (ref 0–0.12)
BILIRUB SERPL-MCNC: 0.3 MG/DL (ref 0.1–1.5)
BUN SERPL-MCNC: 7 MG/DL (ref 8–22)
CALCIUM ALBUM COR SERPL-MCNC: 8.8 MG/DL (ref 8.5–10.5)
CALCIUM SERPL-MCNC: 9.1 MG/DL (ref 8.5–10.5)
CHLORIDE SERPL-SCNC: 105 MMOL/L (ref 96–112)
CO2 SERPL-SCNC: 23 MMOL/L (ref 20–33)
CREAT SERPL-MCNC: 0.56 MG/DL (ref 0.5–1.4)
EOSINOPHIL # BLD AUTO: 0.02 K/UL (ref 0–0.51)
EOSINOPHIL NFR BLD: 0.2 % (ref 0–6.9)
ERYTHROCYTE [DISTWIDTH] IN BLOOD BY AUTOMATED COUNT: 43.9 FL (ref 35.9–50)
GFR SERPLBLD CREATININE-BSD FMLA CKD-EPI: 130 ML/MIN/1.73 M 2
GLOBULIN SER CALC-MCNC: 2.9 G/DL (ref 1.9–3.5)
GLUCOSE SERPL-MCNC: 103 MG/DL (ref 65–99)
HCG SERPL QL: NEGATIVE
HCT VFR BLD AUTO: 40.9 % (ref 37–47)
HGB BLD-MCNC: 13.9 G/DL (ref 12–16)
IMM GRANULOCYTES # BLD AUTO: 0.02 K/UL (ref 0–0.11)
IMM GRANULOCYTES NFR BLD AUTO: 0.2 % (ref 0–0.9)
LYMPHOCYTES # BLD AUTO: 2.08 K/UL (ref 1–4.8)
LYMPHOCYTES NFR BLD: 23.6 % (ref 22–41)
MCH RBC QN AUTO: 30.6 PG (ref 27–33)
MCHC RBC AUTO-ENTMCNC: 34 G/DL (ref 32.2–35.5)
MCV RBC AUTO: 90.1 FL (ref 81.4–97.8)
MONOCYTES # BLD AUTO: 0.64 K/UL (ref 0–0.85)
MONOCYTES NFR BLD AUTO: 7.2 % (ref 0–13.4)
NEUTROPHILS # BLD AUTO: 6.05 K/UL (ref 1.82–7.42)
NEUTROPHILS NFR BLD: 68.6 % (ref 44–72)
NRBC # BLD AUTO: 0 K/UL
NRBC BLD-RTO: 0 /100 WBC (ref 0–0.2)
PLATELET # BLD AUTO: 241 K/UL (ref 164–446)
PMV BLD AUTO: 9.5 FL (ref 9–12.9)
POTASSIUM SERPL-SCNC: 4.2 MMOL/L (ref 3.6–5.5)
PROT SERPL-MCNC: 7.3 G/DL (ref 6–8.2)
RBC # BLD AUTO: 4.54 M/UL (ref 4.2–5.4)
SODIUM SERPL-SCNC: 140 MMOL/L (ref 135–145)
WBC # BLD AUTO: 8.8 K/UL (ref 4.8–10.8)

## 2024-04-22 PROCEDURE — 84703 CHORIONIC GONADOTROPIN ASSAY: CPT

## 2024-04-22 PROCEDURE — 36415 COLL VENOUS BLD VENIPUNCTURE: CPT

## 2024-04-22 PROCEDURE — 80053 COMPREHEN METABOLIC PANEL: CPT

## 2024-04-22 PROCEDURE — 85025 COMPLETE CBC W/AUTO DIFF WBC: CPT

## 2024-04-22 PROCEDURE — 76830 TRANSVAGINAL US NON-OB: CPT

## 2024-04-22 PROCEDURE — 99284 EMERGENCY DEPT VISIT MOD MDM: CPT

## 2024-04-22 ASSESSMENT — FIBROSIS 4 INDEX: FIB4 SCORE: 0.39

## 2024-04-23 NOTE — ED TRIAGE NOTES
Ambulatory to triage w/ c/o LLQ pain x 1 month, vaginal bleeding x 3 weeks.  Bleeding stopped 2 days ago.  Patient reports dizziness.  Patient had labs drawn 1 week ago at [facility].  H&H was normal.  Iron was low.  Patient has an US scheduled 5/11.

## 2024-04-23 NOTE — ED PROVIDER NOTES
"  ER Provider Note    Scribed for Aidan Jain M.D. by Jessica Rizvi. 4/22/2024   5:37 PM    Primary Care Provider: Steph Kee P.A.-C.    CHIEF COMPLAINT  Chief Complaint   Patient presents with    Abdominal Cramping    Vaginal Bleeding     EXTERNAL RECORDS REVIEWED  The patient was last seen on 9/21/23 for a headache.    HPI/ROS    OUTSIDE HISTORIAN(S):  Family    Mary Harris is a 23 y.o. female who presents to the ED for evaluation of abdominal cramping and vaginal bleeding. The patient states she stopped taking her birth control for two months and then started it again. She notes she now has had vaginal bleeding for 3 weeks straight which just stopped a few days ago. The patient reports she had an episode of dizziness and diaphoresis this morning. She adds she has a history of anemia. She has not been able to get in to see her OBGYN.     PAST MEDICAL HISTORY  History reviewed. No pertinent past medical history.    SURGICAL HISTORY  History reviewed. No pertinent surgical history.    FAMILY HISTORY  History reviewed. No pertinent family history.    SOCIAL HISTORY   reports that she has never smoked. She has never used smokeless tobacco. She reports current alcohol use of about 0.6 oz of alcohol per week. She reports that she does not use drugs.    CURRENT MEDICATIONS  Previous Medications    FLUOXETINE (PROZAC) 40 MG CAPSULE    Take 40 mg by mouth every day.    DAVID 1.5/30 1.5-30 MG-MCG TAB           ALLERGIES  Allergies   Allergen Reactions    Food Swelling     Cactus, nectarines    Food Allergy Formula      Peaches          PHYSICAL EXAM  BP (!) 148/85   Pulse 81   Temp 36.1 °C (96.9 °F) (Temporal)   Resp 16   Ht 1.676 m (5' 6\")   Wt 87.1 kg (192 lb 0.3 oz)   SpO2 99%   BMI 30.99 kg/m²    Nursing note and vitals reviewed.  Constitutional: Well-developed and well-nourished. No distress.   HENT: Head is normocephalic and atraumatic. Oropharynx is clear and moist without exudate or erythema. "   Eyes: Pupils are equal, round, and reactive to light. Conjunctiva are normal.   Cardiovascular: Normal rate and regular rhythm. No murmur heard. Normal radial pulses.  Pulmonary/Chest: Breath sounds normal. No wheezes or rales.   Abdominal: Soft, non-tender and non-distended. Normal active bowel sounds. No guarding or peritoneal signs. No palpable abdominal aortic aneurysm. No masses. No tenderness at McBurney's point.   Musculoskeletal: Extremities exhibit normal range of motion without edema or tenderness.   Neurological: Awake, alert and oriented to person, place, and time. No focal deficits noted.  Skin: Skin is warm and dry. No rash.  Psychiatric: Normal mood and affect. Appropriate for clinical situation    DIAGNOSTIC STUDIES    Labs:   Results for orders placed or performed during the hospital encounter of 04/22/24   CBC WITH DIFFERENTIAL   Result Value Ref Range    WBC 8.8 4.8 - 10.8 K/uL    RBC 4.54 4.20 - 5.40 M/uL    Hemoglobin 13.9 12.0 - 16.0 g/dL    Hematocrit 40.9 37.0 - 47.0 %    MCV 90.1 81.4 - 97.8 fL    MCH 30.6 27.0 - 33.0 pg    MCHC 34.0 32.2 - 35.5 g/dL    RDW 43.9 35.9 - 50.0 fL    Platelet Count 241 164 - 446 K/uL    MPV 9.5 9.0 - 12.9 fL    Neutrophils-Polys 68.60 44.00 - 72.00 %    Lymphocytes 23.60 22.00 - 41.00 %    Monocytes 7.20 0.00 - 13.40 %    Eosinophils 0.20 0.00 - 6.90 %    Basophils 0.20 0.00 - 1.80 %    Immature Granulocytes 0.20 0.00 - 0.90 %    Nucleated RBC 0.00 0.00 - 0.20 /100 WBC    Neutrophils (Absolute) 6.05 1.82 - 7.42 K/uL    Lymphs (Absolute) 2.08 1.00 - 4.80 K/uL    Monos (Absolute) 0.64 0.00 - 0.85 K/uL    Eos (Absolute) 0.02 0.00 - 0.51 K/uL    Baso (Absolute) 0.02 0.00 - 0.12 K/uL    Immature Granulocytes (abs) 0.02 0.00 - 0.11 K/uL    NRBC (Absolute) 0.00 K/uL   COMP METABOLIC PANEL   Result Value Ref Range    Sodium 140 135 - 145 mmol/L    Potassium 4.2 3.6 - 5.5 mmol/L    Chloride 105 96 - 112 mmol/L    Co2 23 20 - 33 mmol/L    Anion Gap 12.0 7.0 - 16.0     Glucose 103 (H) 65 - 99 mg/dL    Bun 7 (L) 8 - 22 mg/dL    Creatinine 0.56 0.50 - 1.40 mg/dL    Calcium 9.1 8.5 - 10.5 mg/dL    Correct Calcium 8.8 8.5 - 10.5 mg/dL    AST(SGOT) 16 12 - 45 U/L    ALT(SGPT) 10 2 - 50 U/L    Alkaline Phosphatase 43 30 - 99 U/L    Total Bilirubin 0.3 0.1 - 1.5 mg/dL    Albumin 4.4 3.2 - 4.9 g/dL    Total Protein 7.3 6.0 - 8.2 g/dL    Globulin 2.9 1.9 - 3.5 g/dL    A-G Ratio 1.5 g/dL   HCG QUAL SERUM   Result Value Ref Range    Beta-Hcg Qualitative Serum Negative Negative   ESTIMATED GFR   Result Value Ref Range    GFR (CKD-EPI) 130 >60 mL/min/1.73 m 2     Radiology:   This attending emergency physician has independently interpreted the diagnostic imaging associated with this visit and is awaiting the final reading from the radiologist.   Preliminary interpretation is a follows: negative for ovarian cyst.    Radiologist interpretation:   US-PELVIC COMPLETE (TRANSABDOMINAL/TRANSVAGINAL) (COMBO)   Final Result      1.  Normal transvaginal appearance of the pelvis.           INITIAL ASSESSMENT AND PLAN    5:37 PM - Patient was evaluated at bedside for vaginal bleeding and abdominal cramping. Ordered for US-Pelvic, CBC with diff, CMP and HCG Qual to evaluate. Patient verbalizes understanding and support with my plan of care.  Differential diagnoses include but not limited to: ovarian cyst, medication side effect, UTI, ecoptic pregnancy, miscarriage, threatened miscarriage.      COURSE AND MEDICAL DECISION MAKING    7:46 PM - Ultrasound is negative. The patient was updated at bedside. She was instructed to take her birth control as prescribed until she can follow up with her OBGYN to consider other options. She was advised of all return precautions. Patient verbalizes understanding and agreement to this plan of care.       DISPOSITION AND DISCUSSIONS     Escalation of care considered, and ultimately not performed: acute inpatient care management, however at this time, the patient is most  appropriate for outpatient management.     Decision tools and prescription drugs considered including, but not limited to: Medication modification: I do not see any indication for medication modification.    The patient will return for new or worsening symptoms and is stable at the time of discharge.    DISPOSITION:  Patient will be discharged home in stable condition.    FOLLOW UP:  Steph Kee P.A.-C.  [address] [address]  [place] [address]  [phone]    Schedule an appointment as soon as possible for a visit       [facility], Emergency Dept  [address] [facility] [address] [phone]    If symptoms worsen    [facility]  [address]  [facility] [address]  [phone]  Schedule an appointment as soon as possible for a visit       FINAL DIAGNOSIS  1. Abnormal vaginal bleeding         Jessica BURROWS (Scribe), am scribing for, and in the presence of, Aidan Jain M.D..    Electronically signed by: Jessica Rizvi (Paulieibe), 4/22/2024    IAidan M.D. personally performed the services described in this documentation, as scribed by Jessica Rizvi in my presence, and it is both accurate and complete.      The note accurately reflects work and decisions made by me.  Aidan Jain M.D.  4/22/2024  9:41 PM

## 2024-05-11 ENCOUNTER — APPOINTMENT (OUTPATIENT)
Dept: RADIOLOGY | Facility: MEDICAL CENTER | Age: 24
End: 2024-05-11
Attending: STUDENT IN AN ORGANIZED HEALTH CARE EDUCATION/TRAINING PROGRAM
Payer: COMMERCIAL

## 2024-05-28 ENCOUNTER — APPOINTMENT (OUTPATIENT)
Dept: URGENT CARE | Facility: PHYSICIAN GROUP | Age: 24
End: 2024-05-28
Payer: COMMERCIAL

## 2024-07-23 ENCOUNTER — OFFICE VISIT (OUTPATIENT)
Dept: NEUROLOGY | Facility: MEDICAL CENTER | Age: 24
End: 2024-07-23
Attending: PSYCHIATRY & NEUROLOGY
Payer: COMMERCIAL

## 2024-07-23 VITALS
SYSTOLIC BLOOD PRESSURE: 112 MMHG | TEMPERATURE: 100 F | HEART RATE: 77 BPM | OXYGEN SATURATION: 97 % | BODY MASS INDEX: 30.93 KG/M2 | DIASTOLIC BLOOD PRESSURE: 70 MMHG | WEIGHT: 192.46 LBS | HEIGHT: 66 IN

## 2024-07-23 DIAGNOSIS — G51.0 BELL'S PALSY: ICD-10-CM

## 2024-07-23 DIAGNOSIS — G43.109 MIGRAINE WITH AURA AND WITHOUT STATUS MIGRAINOSUS, NOT INTRACTABLE: Primary | ICD-10-CM

## 2024-07-23 PROCEDURE — 99204 OFFICE O/P NEW MOD 45 MIN: CPT | Performed by: PSYCHIATRY & NEUROLOGY

## 2024-07-23 PROCEDURE — 3074F SYST BP LT 130 MM HG: CPT | Performed by: PSYCHIATRY & NEUROLOGY

## 2024-07-23 PROCEDURE — 99211 OFF/OP EST MAY X REQ PHY/QHP: CPT | Performed by: PSYCHIATRY & NEUROLOGY

## 2024-07-23 PROCEDURE — 3078F DIAST BP <80 MM HG: CPT | Performed by: PSYCHIATRY & NEUROLOGY

## 2024-07-23 RX ORDER — SUMATRIPTAN 100 MG/1
100 TABLET, FILM COATED ORAL
Qty: 10 TABLET | Refills: 3 | Status: SHIPPED | OUTPATIENT
Start: 2024-07-23

## 2024-07-23 ASSESSMENT — FIBROSIS 4 INDEX: FIB4 SCORE: 0.52

## 2024-07-23 ASSESSMENT — PATIENT HEALTH QUESTIONNAIRE - PHQ9: CLINICAL INTERPRETATION OF PHQ2 SCORE: 0

## 2024-09-30 ENCOUNTER — APPOINTMENT (OUTPATIENT)
Dept: RADIOLOGY | Facility: MEDICAL CENTER | Age: 24
End: 2024-09-30
Attending: PSYCHIATRY & NEUROLOGY
Payer: COMMERCIAL

## 2024-09-30 DIAGNOSIS — G51.0 BELL'S PALSY: ICD-10-CM

## 2024-09-30 PROCEDURE — 700117 HCHG RX CONTRAST REV CODE 255: Mod: JZ | Performed by: PSYCHIATRY & NEUROLOGY

## 2024-09-30 PROCEDURE — A9579 GAD-BASE MR CONTRAST NOS,1ML: HCPCS | Mod: JZ | Performed by: PSYCHIATRY & NEUROLOGY

## 2024-09-30 PROCEDURE — 70553 MRI BRAIN STEM W/O & W/DYE: CPT

## 2024-09-30 RX ADMIN — GADOTERIDOL 18 ML: 279.3 INJECTION, SOLUTION INTRAVENOUS at 15:29

## 2024-10-29 ENCOUNTER — APPOINTMENT (OUTPATIENT)
Dept: NEUROLOGY | Facility: MEDICAL CENTER | Age: 24
End: 2024-10-29
Attending: PSYCHIATRY & NEUROLOGY
Payer: COMMERCIAL

## 2025-02-20 ENCOUNTER — HOSPITAL ENCOUNTER (EMERGENCY)
Facility: MEDICAL CENTER | Age: 25
End: 2025-02-21
Attending: EMERGENCY MEDICINE
Payer: COMMERCIAL

## 2025-02-20 DIAGNOSIS — L02.31 ABSCESS OF GLUTEAL CLEFT: ICD-10-CM

## 2025-02-20 PROCEDURE — 303977 HCHG I & D

## 2025-02-20 PROCEDURE — 99282 EMERGENCY DEPT VISIT SF MDM: CPT

## 2025-02-20 ASSESSMENT — FIBROSIS 4 INDEX: FIB4 SCORE: 0.55

## 2025-02-21 VITALS
HEIGHT: 66 IN | BODY MASS INDEX: 31.5 KG/M2 | WEIGHT: 195.99 LBS | SYSTOLIC BLOOD PRESSURE: 107 MMHG | DIASTOLIC BLOOD PRESSURE: 65 MMHG | OXYGEN SATURATION: 96 % | TEMPERATURE: 97.8 F | HEART RATE: 69 BPM | RESPIRATION RATE: 15 BRPM

## 2025-02-21 PROCEDURE — 303977 HCHG I & D

## 2025-02-21 NOTE — ED TRIAGE NOTES
"Chief Complaint   Patient presents with    Other     Started last Monday     Ambulatory to triage, states he had like a \"cyst\" on her buttocks, hard and ans very painful to touch; redness noted upon assessment with tenderness.  Patient states she went to UC today and was already prescribed with antibiotics had 1 dose this evening.     AOX4 GCS15.    /81   Pulse 81   Temp 36.6 °C (97.8 °F) (Temporal)   Resp 18   Ht 1.676 m (5' 6\")   Wt 88.9 kg (195 lb 15.8 oz)   SpO2 98%   BMI 31.63 kg/m²     "

## 2025-02-21 NOTE — ED NOTES
Pt discharged to home. Discharge paperwork provided. Education provided by ERP. Reinforced discharge instructions.  Pt was given follow up instructions.  Pt verbalized understanding of all instructions for discharge.   Patient went out of the ER ambulatory with steady gait., alert and oriented x 4, with all belongings.     Family to drive pt home

## 2025-02-21 NOTE — ED PROVIDER NOTES
ED Provider Note    CHIEF COMPLAINT  Chief Complaint   Patient presents with    Other     Started last Monday        [facility]    Primary care provider: Steph Kee P.A.-C.   History obtained from: Patient  History limited by: None     Mary Harris is a 24 y.o. female who presents to the ED with  complaining of abscess on her posterior gluteal region.  It started as a small bump 3 days ago and has progressively worsened.  She was seen at urgent care today and prescribed Bactrim but did not have I&D performed.  She is requesting to have I&D performed.  She reports a similar episode approximately a year ago and was seen in this ED and had I&D performed.  She denies any change with bowel movements or urination.  She denies possibility of pregnancy and reports being compliant with her birth control.  She denies fever/chills.    REVIEW OF SYSTEMS  Please see HPI for pertinent positives/negatives.  All other systems reviewed and are negative.     PAST MEDICAL HISTORY  No past medical history on file.     SURGICAL HISTORY  History reviewed. No pertinent surgical history.     SOCIAL HISTORY  Social History     Tobacco Use    Smoking status: Never    Smokeless tobacco: Never   Vaping Use    Vaping status: Never Used   Substance and Sexual Activity    Alcohol use: Yes     Alcohol/week: 0.6 oz     Types: 1 Standard drinks or equivalent per week     Comment: occasional    Drug use: No    Sexual activity: Not on file        FAMILY HISTORY  History reviewed. No pertinent family history.     CURRENT MEDICATIONS  Home Medications       Reviewed by Yissel Heredia R.N. (Registered Nurse) on 02/20/25 at 3983  Med List Status: Partial     Medication Last Dose Status   fluoxetine (PROZAC) 40 MG capsule  Active   DAVID 1.5/30 1.5-30 MG-MCG Tab  Active   sumatriptan (IMITREX) 100 MG tablet  Active                     ALLERGIES  Allergies   Allergen Reactions    Food  "Swelling     Cactus, nectarines    Food Allergy Formula      Peaches          PHYSICAL EXAM  VITAL SIGNS: /65   Pulse 69   Temp 36.6 °C (97.8 °F) (Temporal)   Resp 15   Ht 1.676 m (5' 6\")   Wt 88.9 kg (195 lb 15.8 oz)   SpO2 96%   BMI 31.63 kg/m²  @VAIRNDER[[id number]::@     Pulse ox interpretation: 98% I interpret this pulse ox as normal     Constitutional: Well developed, well nourished, alert in no apparent distress, nontoxic appearance    HENT: No external signs of trauma, normocephalic    Eyes: No discharge, no icterus     Neck: No stridor    Cardiovascular: Strong distal pulses and good perfusion    Thorax & Lungs: No respiratory distress    Extremities: No cyanosis, no edema, no gross deformity, intact distal pulses with brisk cap refill    Skin: Warm, dry, no pallor/cyanosis, approximately 2 cm palpable tender lump on left gluteal cleft posteriorly with overlying erythema without streaking/drainage/crepitus/fluctuance  Neuro: A/O times 3, no focal deficits noted, ambulating without difficulty  Psychiatric: Cooperative, normal mood and affect, normal judgement, appropriate for clinical situation        DIAGNOSTIC STUDIES / PROCEDURES    Verbal consent was obtained for incision and drainage.  The area of the incision site was sterilely prepped/draped and locally infiltrated with 3 mL of 0.5% Marcaine with epi.  An incision was then made with a #11 blade over the apex of the lesion.  Small amount of purulent material was expressed.  Loculations were broken up using a hemostat and the cavity was packed with sterile gauze.  The patient tolerated the procedure without complications.        LABS  All labs reviewed by me.     Results for orders placed or performed during the hospital encounter of 04/22/24   CBC WITH DIFFERENTIAL    Collection Time: 04/22/24  5:21 PM   Result Value Ref Range    WBC 8.8 4.8 - 10.8 K/uL    RBC 4.54 4.20 - 5.40 M/uL    Hemoglobin 13.9 12.0 - 16.0 g/dL    Hematocrit 40.9 37.0 - 47.0 " %    MCV 90.1 81.4 - 97.8 fL    MCH 30.6 27.0 - 33.0 pg    MCHC 34.0 32.2 - 35.5 g/dL    RDW 43.9 35.9 - 50.0 fL    Platelet Count 241 164 - 446 K/uL    MPV 9.5 9.0 - 12.9 fL    Neutrophils-Polys 68.60 44.00 - 72.00 %    Lymphocytes 23.60 22.00 - 41.00 %    Monocytes 7.20 0.00 - 13.40 %    Eosinophils 0.20 0.00 - 6.90 %    Basophils 0.20 0.00 - 1.80 %    Immature Granulocytes 0.20 0.00 - 0.90 %    Nucleated RBC 0.00 0.00 - 0.20 /100 WBC    Neutrophils (Absolute) 6.05 1.82 - 7.42 K/uL    Lymphs (Absolute) 2.08 1.00 - 4.80 K/uL    Monos (Absolute) 0.64 0.00 - 0.85 K/uL    Eos (Absolute) 0.02 0.00 - 0.51 K/uL    Baso (Absolute) 0.02 0.00 - 0.12 K/uL    Immature Granulocytes (abs) 0.02 0.00 - 0.11 K/uL    NRBC (Absolute) 0.00 K/uL   COMP METABOLIC PANEL    Collection Time: 04/22/24  5:21 PM   Result Value Ref Range    Sodium 140 135 - 145 mmol/L    Potassium 4.2 3.6 - 5.5 mmol/L    Chloride 105 96 - 112 mmol/L    Co2 23 20 - 33 mmol/L    Anion Gap 12.0 7.0 - 16.0    Glucose 103 (H) 65 - 99 mg/dL    Bun 7 (L) 8 - 22 mg/dL    Creatinine 0.56 0.50 - 1.40 mg/dL    Calcium 9.1 8.5 - 10.5 mg/dL    Correct Calcium 8.8 8.5 - 10.5 mg/dL    AST(SGOT) 16 12 - 45 U/L    ALT(SGPT) 10 2 - 50 U/L    Alkaline Phosphatase 43 30 - 99 U/L    Total Bilirubin 0.3 0.1 - 1.5 mg/dL    Albumin 4.4 3.2 - 4.9 g/dL    Total Protein 7.3 6.0 - 8.2 g/dL    Globulin 2.9 1.9 - 3.5 g/dL    A-G Ratio 1.5 g/dL   HCG QUAL SERUM    Collection Time: 04/22/24  5:21 PM   Result Value Ref Range    Beta-Hcg Qualitative Serum Negative Negative   ESTIMATED GFR    Collection Time: 04/22/24  5:21 PM   Result Value Ref Range    GFR (CKD-EPI) 130 >60 mL/min/1.73 m 2        RADIOLOGY  I have independently interpreted the diagnostic imaging associated with this visit and am waiting the final reading from the radiologist.     No orders to display          COURSE & MEDICAL DECISION MAKING  Nursing notes, VS, PMSFHx reviewed in chart.     Review of past medical records  shows the patient had outpatient visit today with diagnosis of pilonidal cyst.      Differential diagnoses considered include but are not limited to: Abscess, cellulitis, pilonidal cyst      ED Observation Status? No; Patient does not meet criteria for ED Observation.       Discussion of management with other hospitals or appropriate source(s): None     Escalation of care considered, and ultimately not performed: Laboratory analysis and diagnostic imaging.     Decision tools and prescription drugs considered including, but not limited to: Antibiotics   .        History and physical exam as above.  This is a generally healthy 24-year-old female patient who presents to the ED with above complaints.  She is noted to have apparent left gluteal cleft abscess and requested I&D which was performed as above.  At this time, I have low clinical suspicion for concerning pathology such as sepsis, necrotizing fasciitis, intra-abdominal/pelvic infection, fistula formation, osteomyelitis.  She was advised to continue her prescribed Bactrim from urgent care.  I discussed with her supportive home care including warm compresses, outpatient follow-up and return to ED precautions.  Patient verbalized understanding and agreed with plan of care with no further questions or concerns.      The patient is referred to a primary physician for blood pressure management, diabetic screening, and for all other preventative health concerns.       FINAL IMPRESSION  1. Abscess of gluteal cleft Acute          DISPOSITION  Patient will be discharged home in stable condition.       FOLLOW UP  Steph Kee P.A.-C.  [address] [id number] [phone]    Call today      [facility], Emergency Dept  [address] [facility] [address] [phone]    If symptoms worsen         OUTPATIENT MEDICATIONS  Discharge Medication List as of 2/21/2025 12:02 AM             Electronically signed by: Cesar Covarrubias D.O.,  2/20/2025 11:18 PM      Portions of this record were made with voice recognition software.  Despite my review, errors may remain.  Please interpret this chart in the appropriate context.

## 2025-08-26 DIAGNOSIS — Z00.6 CLINICAL TRIAL PARTICIPANT: ICD-10-CM

## 2025-08-29 ENCOUNTER — HOSPITAL ENCOUNTER (OUTPATIENT)
Dept: LAB | Facility: MEDICAL CENTER | Age: 25
End: 2025-08-29
Attending: FAMILY MEDICINE
Payer: COMMERCIAL

## 2025-08-29 DIAGNOSIS — Z00.6 CLINICAL TRIAL PARTICIPANT: ICD-10-CM
